# Patient Record
Sex: FEMALE | Race: WHITE
[De-identification: names, ages, dates, MRNs, and addresses within clinical notes are randomized per-mention and may not be internally consistent; named-entity substitution may affect disease eponyms.]

---

## 2017-07-03 ENCOUNTER — HOSPITAL ENCOUNTER (OUTPATIENT)
Dept: HOSPITAL 83 - LAB | Age: 82
Discharge: HOME | End: 2017-07-03
Attending: INTERNAL MEDICINE
Payer: MEDICARE

## 2017-07-03 DIAGNOSIS — I10: Primary | ICD-10-CM

## 2017-07-03 DIAGNOSIS — E78.00: ICD-10-CM

## 2017-07-03 LAB
ALBUMIN SERPL-MCNC: 3.1 GM/DL (ref 3.1–4.5)
ALP SERPL-CCNC: 78 U/L (ref 45–117)
ALT SERPL W P-5'-P-CCNC: 14 U/L (ref 12–78)
AST SERPL-CCNC: 16 IU/L (ref 3–35)
BASOPHILS # BLD AUTO: 0 10*3/UL (ref 0–0.1)
BASOPHILS NFR BLD AUTO: 0.5 % (ref 0–1)
BUN SERPL-MCNC: 17 MG/DL (ref 7–24)
CHLORIDE SERPL-SCNC: 111 MMOL/L (ref 98–107)
CHOLEST SERPL-MCNC: 153 MG/DL (ref ?–200)
CO2 SERPL-SCNC: 26 MMOL/L (ref 21–32)
EOSINOPHIL # BLD AUTO: 0.2 10*3/UL (ref 0–0.4)
EOSINOPHIL # BLD AUTO: 3.1 % (ref 1–4)
ERYTHROCYTE [DISTWIDTH] IN BLOOD BY AUTOMATED COUNT: 15.3 % (ref 0–14.5)
GLUCOSE SERPL-MCNC: 96 MG/DL (ref 65–99)
HCT VFR BLD AUTO: 37.3 % (ref 37–47)
HDLC SERPL-MCNC: 39 MG/DL (ref 40–60)
HGB BLD-MCNC: 11.7 G/DL (ref 12–16)
IG #: 0.1 10*3/UL (ref 0–0.1)
LDLC SERPL DIRECT ASSAY-MCNC: 92 MG/DL (ref 9–159)
LYMPHOCYTES # BLD AUTO: 1.6 10*3/UL (ref 1.3–4.4)
LYMPHOCYTES NFR BLD AUTO: 27.7 % (ref 27–41)
MCH RBC QN AUTO: 28.5 PG (ref 27–31)
MCHC RBC AUTO-ENTMCNC: 31.4 G/DL (ref 33–37)
MCV RBC AUTO: 90.8 FL (ref 81–99)
MONOCYTES # BLD AUTO: 0.4 10*3/UL (ref 0.1–1)
MONOCYTES NFR BLD MANUAL: 7.1 % (ref 3–9)
NEUT #: 3.6 10*3/UL (ref 2.3–7.9)
NEUT %: 60.8 % (ref 47–73)
NRBC BLD QL AUTO: 0 10*3/UL (ref 0–0)
PLATELET # BLD AUTO: 173 10*3/UL (ref 130–400)
PMV BLD AUTO: 10.9 FL (ref 9.6–12.3)
POTASSIUM SERPL-SCNC: 3.8 MMOL/L (ref 3.5–5.1)
PROT SERPL-MCNC: 7 GM/DL (ref 6.4–8.2)
RBC # BLD AUTO: 4.11 10*6/UL (ref 4.1–5.1)
SODIUM SERPL-SCNC: 146 MMOL/L (ref 136–145)
TRIGL SERPL-MCNC: 109 MG/DL (ref ?–150)
TSH SERPL DL<=0.005 MIU/L-ACNC: 1.39 UIU/ML (ref 0.36–4.75)
VLDLC SERPL CALC-MCNC: 22 MG/DL (ref 6–40)
WBC NRBC COR # BLD AUTO: 5.9 10*3/UL (ref 4.8–10.8)

## 2017-11-15 ENCOUNTER — HOSPITAL ENCOUNTER (INPATIENT)
Dept: HOSPITAL 83 - ED | Age: 82
LOS: 3 days | Discharge: OTHER INSTITUTION | DRG: 552 | End: 2017-11-18
Attending: INTERNAL MEDICINE | Admitting: INTERNAL MEDICINE
Payer: MEDICARE

## 2017-11-15 VITALS — BODY MASS INDEX: 24.52 KG/M2 | WEIGHT: 133.25 LBS | HEIGHT: 61.97 IN

## 2017-11-15 VITALS — DIASTOLIC BLOOD PRESSURE: 44 MMHG | SYSTOLIC BLOOD PRESSURE: 158 MMHG

## 2017-11-15 VITALS — DIASTOLIC BLOOD PRESSURE: 57 MMHG

## 2017-11-15 VITALS — DIASTOLIC BLOOD PRESSURE: 59 MMHG
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
## 2017-11-15 DIAGNOSIS — Z98.41: ICD-10-CM

## 2017-11-15 DIAGNOSIS — E87.8: ICD-10-CM

## 2017-11-15 DIAGNOSIS — Z82.49: ICD-10-CM

## 2017-11-15 DIAGNOSIS — S32.302A: ICD-10-CM
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
## 2017-11-15 DIAGNOSIS — I48.91: ICD-10-CM

## 2017-11-15 DIAGNOSIS — Z84.89: ICD-10-CM

## 2017-11-15 DIAGNOSIS — H40.9: ICD-10-CM

## 2017-11-15 DIAGNOSIS — I10: ICD-10-CM
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
## 2017-11-15 DIAGNOSIS — N39.0: ICD-10-CM

## 2017-11-15 DIAGNOSIS — Z86.73: ICD-10-CM

## 2017-11-15 DIAGNOSIS — Z60.2: ICD-10-CM

## 2017-11-15 DIAGNOSIS — D64.9: ICD-10-CM
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
## 2017-11-15 DIAGNOSIS — S32.502A: ICD-10-CM

## 2017-11-15 DIAGNOSIS — R73.9: ICD-10-CM

## 2017-11-15 DIAGNOSIS — S32.10XA: Primary | ICD-10-CM

## 2017-11-15 DIAGNOSIS — Z79.899: ICD-10-CM

## 2017-11-15 DIAGNOSIS — Z88.2: ICD-10-CM

## 2017-11-15 DIAGNOSIS — E78.5: ICD-10-CM

## 2017-11-15 LAB
APPEARANCE UR: (no result)
APTT PPP: 25.6 SECONDS (ref 20.8–31.5)
BACTERIA #/AREA URNS HPF: (no result) /[HPF]
BASOPHILS # BLD AUTO: 0 10*3/UL (ref 0–0.1)
BASOPHILS NFR BLD AUTO: 0.3 % (ref 0–1)
BILIRUB UR QL STRIP: NEGATIVE
BUN SERPL-MCNC: 21 MG/DL (ref 7–24)
CHLORIDE SERPL-SCNC: 108 MMOL/L (ref 98–107)
COLOR UR: YELLOW
CREAT SERPL-MCNC: 0.87 MG/DL (ref 0.55–1.02)
EOSINOPHIL # BLD AUTO: 0.1 10*3/UL (ref 0–0.4)
EOSINOPHIL # BLD AUTO: 0.7 % (ref 1–4)
EPI CELLS #/AREA URNS HPF: (no result) /[HPF]
ERYTHROCYTE [DISTWIDTH] IN BLOOD BY AUTOMATED COUNT: 14.9 % (ref 0–14.5)
GLUCOSE UR QL: NEGATIVE
HCT VFR BLD AUTO: 36.7 % (ref 37–47)
HGB BLD-MCNC: 11.8 G/DL (ref 12–16)
HGB UR QL STRIP: (no result)
INR BLD: 1.1 (ref 2–3.5)
KETONES UR QL STRIP: (no result)
LEUKOCYTE ESTERASE UR QL STRIP: (no result)
LYMPHOCYTES # BLD AUTO: 1.4 10*3/UL (ref 1.3–4.4)
LYMPHOCYTES NFR BLD AUTO: 10.3 % (ref 27–41)
MCH RBC QN AUTO: 28.3 PG (ref 27–31)
MCHC RBC AUTO-ENTMCNC: 32.2 G/DL (ref 33–37)
MCV RBC AUTO: 88 FL (ref 81–99)
MONOCYTES # BLD AUTO: 0.5 10*3/UL (ref 0.1–1)
MONOCYTES NFR BLD MANUAL: 3.9 % (ref 3–9)
NEUT #: 11.4 10*3/UL (ref 2.3–7.9)
NEUT %: 83.2 % (ref 47–73)
NITRITE UR QL STRIP: POSITIVE
NRBC BLD QL AUTO: 0 % (ref 0–0)
PH UR STRIP: 6.5 [PH] (ref 5–9)
PLATELET # BLD AUTO: 146 10*3/UL (ref 130–400)
PMV BLD AUTO: 11.4 FL (ref 9.6–12.3)
POTASSIUM SERPL-SCNC: 4.3 MMOL/L (ref 3.5–5.1)
RBC # BLD AUTO: 4.17 10*6/UL (ref 4.1–5.1)
RBC #/AREA URNS HPF: (no result) RBC/HPF (ref 0–2)
SODIUM SERPL-SCNC: 139 MMOL/L (ref 136–145)
SP GR UR: 1.01 (ref 1–1.03)
UROBILINOGEN UR STRIP-MCNC: 0.2 E.U./DL (ref 0.2–1)
WBC #/AREA URNS HPF: (no result) WBC/HPF (ref 0–5)
WBC NRBC COR # BLD AUTO: 13.7 10*3/UL (ref 4.8–10.8)

## 2017-11-15 SDOH — SOCIAL STABILITY - SOCIAL INSECURITY: PROBLEMS RELATED TO LIVING ALONE: Z60.2

## 2017-11-16 VITALS — DIASTOLIC BLOOD PRESSURE: 50 MMHG | SYSTOLIC BLOOD PRESSURE: 138 MMHG

## 2017-11-16 VITALS — DIASTOLIC BLOOD PRESSURE: 51 MMHG

## 2017-11-16 VITALS — SYSTOLIC BLOOD PRESSURE: 160 MMHG | DIASTOLIC BLOOD PRESSURE: 78 MMHG

## 2017-11-16 VITALS — DIASTOLIC BLOOD PRESSURE: 61 MMHG | SYSTOLIC BLOOD PRESSURE: 158 MMHG

## 2017-11-16 VITALS — DIASTOLIC BLOOD PRESSURE: 53 MMHG

## 2017-11-16 LAB
25(OH)D3 SERPL-MCNC: 36.6 NG/ML (ref 30–100)
BASOPHILS # BLD AUTO: 0 10*3/UL (ref 0–0.1)
BASOPHILS NFR BLD AUTO: 0.5 % (ref 0–1)
BUN SERPL-MCNC: 23 MG/DL (ref 7–24)
CHLORIDE SERPL-SCNC: 108 MMOL/L (ref 98–107)
CREAT SERPL-MCNC: 0.81 MG/DL (ref 0.55–1.02)
EOSINOPHIL # BLD AUTO: 0 10*3/UL (ref 0–0.4)
EOSINOPHIL # BLD AUTO: 0.5 % (ref 1–4)
ERYTHROCYTE [DISTWIDTH] IN BLOOD BY AUTOMATED COUNT: 15 % (ref 0–14.5)
HCT VFR BLD AUTO: 31.3 % (ref 37–47)
HGB BLD-MCNC: 10.2 G/DL (ref 12–16)
LYMPHOCYTES # BLD AUTO: 1.2 10*3/UL (ref 1.3–4.4)
LYMPHOCYTES NFR BLD AUTO: 18.1 % (ref 27–41)
MCH RBC QN AUTO: 28.8 PG (ref 27–31)
MCHC RBC AUTO-ENTMCNC: 32.6 G/DL (ref 33–37)
MCV RBC AUTO: 88.4 FL (ref 81–99)
MONOCYTES # BLD AUTO: 0.4 10*3/UL (ref 0.1–1)
MONOCYTES NFR BLD MANUAL: 6.6 % (ref 3–9)
NEUT #: 4.7 10*3/UL (ref 2.3–7.9)
NEUT %: 73.5 % (ref 47–73)
NRBC BLD QL AUTO: 0 % (ref 0–0)
PLATELET # BLD AUTO: 131 10*3/UL (ref 130–400)
PMV BLD AUTO: 12.5 FL (ref 9.6–12.3)
POTASSIUM SERPL-SCNC: 4 MMOL/L (ref 3.5–5.1)
RBC # BLD AUTO: 3.54 10*6/UL (ref 4.1–5.1)
SODIUM SERPL-SCNC: 140 MMOL/L (ref 136–145)
TSH SERPL DL<=0.005 MIU/L-ACNC: 0.91 UIU/ML (ref 0.36–4.75)
VITAMIN B12: 308 PG/ML (ref 247–911)
WBC NRBC COR # BLD AUTO: 6.4 10*3/UL (ref 4.8–10.8)

## 2017-11-17 VITALS — DIASTOLIC BLOOD PRESSURE: 78 MMHG | SYSTOLIC BLOOD PRESSURE: 120 MMHG

## 2017-11-17 VITALS — DIASTOLIC BLOOD PRESSURE: 54 MMHG | SYSTOLIC BLOOD PRESSURE: 140 MMHG

## 2017-11-17 VITALS — DIASTOLIC BLOOD PRESSURE: 60 MMHG

## 2017-11-17 VITALS — DIASTOLIC BLOOD PRESSURE: 68 MMHG

## 2017-11-17 VITALS — SYSTOLIC BLOOD PRESSURE: 137 MMHG | DIASTOLIC BLOOD PRESSURE: 50 MMHG

## 2017-11-18 VITALS — SYSTOLIC BLOOD PRESSURE: 134 MMHG | DIASTOLIC BLOOD PRESSURE: 48 MMHG

## 2017-11-18 VITALS — DIASTOLIC BLOOD PRESSURE: 58 MMHG

## 2017-12-11 ENCOUNTER — HOSPITAL ENCOUNTER (OUTPATIENT)
Dept: HOSPITAL 83 - ORTHO | Age: 82
Discharge: HOME | End: 2017-12-11
Attending: ORTHOPAEDIC SURGERY
Payer: MEDICARE
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
## 2017-12-11 DIAGNOSIS — S32.502D: Primary | ICD-10-CM

## 2018-04-06 ENCOUNTER — HOSPITAL ENCOUNTER (OUTPATIENT)
Dept: HOSPITAL 83 - RAD | Age: 83
Discharge: HOME | End: 2018-04-06
Attending: INTERNAL MEDICINE
Payer: MEDICARE

## 2018-04-06 DIAGNOSIS — M40.294: ICD-10-CM

## 2018-04-06 DIAGNOSIS — I51.7: ICD-10-CM

## 2018-04-06 DIAGNOSIS — I10: ICD-10-CM

## 2018-04-06 DIAGNOSIS — J90: Primary | ICD-10-CM

## 2018-04-10 ENCOUNTER — HOSPITAL ENCOUNTER (OUTPATIENT)
Dept: HOSPITAL 83 - CARD | Age: 83
Discharge: HOME | End: 2018-04-10
Attending: INTERNAL MEDICINE
Payer: MEDICARE

## 2018-04-10 DIAGNOSIS — I35.0: Primary | ICD-10-CM

## 2018-04-18 ENCOUNTER — HOSPITAL ENCOUNTER (OUTPATIENT)
Dept: HOSPITAL 83 - RAD | Age: 83
Discharge: HOME | End: 2018-04-18
Attending: INTERNAL MEDICINE
Payer: MEDICARE

## 2018-04-18 DIAGNOSIS — R06.09: Primary | ICD-10-CM

## 2019-09-25 ENCOUNTER — HOSPITAL ENCOUNTER (INPATIENT)
Dept: HOSPITAL 83 - ED | Age: 84
LOS: 6 days | Discharge: OTHER INSTITUTION | DRG: 871 | End: 2019-10-01
Attending: INTERNAL MEDICINE | Admitting: INTERNAL MEDICINE
Payer: MEDICARE

## 2019-09-25 VITALS — SYSTOLIC BLOOD PRESSURE: 130 MMHG | DIASTOLIC BLOOD PRESSURE: 68 MMHG

## 2019-09-25 VITALS — DIASTOLIC BLOOD PRESSURE: 78 MMHG

## 2019-09-25 VITALS — HEIGHT: 62 IN | BODY MASS INDEX: 24.91 KG/M2 | WEIGHT: 135.37 LBS

## 2019-09-25 VITALS — DIASTOLIC BLOOD PRESSURE: 94 MMHG | SYSTOLIC BLOOD PRESSURE: 141 MMHG

## 2019-09-25 VITALS — SYSTOLIC BLOOD PRESSURE: 129 MMHG | DIASTOLIC BLOOD PRESSURE: 72 MMHG

## 2019-09-25 VITALS — DIASTOLIC BLOOD PRESSURE: 72 MMHG

## 2019-09-25 VITALS — DIASTOLIC BLOOD PRESSURE: 97 MMHG

## 2019-09-25 VITALS — DIASTOLIC BLOOD PRESSURE: 99 MMHG | SYSTOLIC BLOOD PRESSURE: 145 MMHG

## 2019-09-25 VITALS — DIASTOLIC BLOOD PRESSURE: 104 MMHG

## 2019-09-25 VITALS — DIASTOLIC BLOOD PRESSURE: 92 MMHG | SYSTOLIC BLOOD PRESSURE: 182 MMHG

## 2019-09-25 VITALS — DIASTOLIC BLOOD PRESSURE: 77 MMHG | SYSTOLIC BLOOD PRESSURE: 148 MMHG

## 2019-09-25 VITALS — DIASTOLIC BLOOD PRESSURE: 97 MMHG | SYSTOLIC BLOOD PRESSURE: 138 MMHG

## 2019-09-25 VITALS — DIASTOLIC BLOOD PRESSURE: 59 MMHG | SYSTOLIC BLOOD PRESSURE: 142 MMHG

## 2019-09-25 VITALS — DIASTOLIC BLOOD PRESSURE: 84 MMHG

## 2019-09-25 VITALS — DIASTOLIC BLOOD PRESSURE: 81 MMHG

## 2019-09-25 VITALS — DIASTOLIC BLOOD PRESSURE: 108 MMHG

## 2019-09-25 VITALS — DIASTOLIC BLOOD PRESSURE: 74 MMHG

## 2019-09-25 VITALS — DIASTOLIC BLOOD PRESSURE: 103 MMHG

## 2019-09-25 VITALS — DIASTOLIC BLOOD PRESSURE: 85 MMHG

## 2019-09-25 VITALS — DIASTOLIC BLOOD PRESSURE: 90 MMHG

## 2019-09-25 DIAGNOSIS — S32.020A: ICD-10-CM

## 2019-09-25 DIAGNOSIS — I50.22: ICD-10-CM

## 2019-09-25 DIAGNOSIS — A41.9: Primary | ICD-10-CM

## 2019-09-25 DIAGNOSIS — E78.5: ICD-10-CM
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
## 2019-09-25 DIAGNOSIS — Z60.2: ICD-10-CM

## 2019-09-25 DIAGNOSIS — Z90.89: ICD-10-CM

## 2019-09-25 DIAGNOSIS — N39.0: ICD-10-CM

## 2019-09-25 DIAGNOSIS — Z87.81: ICD-10-CM

## 2019-09-25 DIAGNOSIS — F32.9: ICD-10-CM

## 2019-09-25 DIAGNOSIS — I24.8: ICD-10-CM

## 2019-09-25 DIAGNOSIS — Z88.2: ICD-10-CM

## 2019-09-25 DIAGNOSIS — S32.010A: ICD-10-CM

## 2019-09-25 DIAGNOSIS — Z86.73: ICD-10-CM
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
## 2019-09-25 DIAGNOSIS — I11.0: ICD-10-CM

## 2019-09-25 DIAGNOSIS — S22.31XA: ICD-10-CM
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
## 2019-09-25 DIAGNOSIS — Z84.89: ICD-10-CM

## 2019-09-25 DIAGNOSIS — Z82.49: ICD-10-CM

## 2019-09-25 DIAGNOSIS — E87.8: ICD-10-CM

## 2019-09-25 DIAGNOSIS — E87.2: ICD-10-CM

## 2019-09-25 DIAGNOSIS — E83.42: ICD-10-CM

## 2019-09-25 DIAGNOSIS — R65.20: ICD-10-CM

## 2019-09-25 DIAGNOSIS — I48.91: ICD-10-CM

## 2019-09-25 DIAGNOSIS — E87.6: ICD-10-CM
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
## 2019-09-25 DIAGNOSIS — R74.0: ICD-10-CM

## 2019-09-25 DIAGNOSIS — E44.0: ICD-10-CM

## 2019-09-25 DIAGNOSIS — Z87.440: ICD-10-CM

## 2019-09-25 DIAGNOSIS — I73.9: ICD-10-CM

## 2019-09-25 DIAGNOSIS — Z79.899: ICD-10-CM

## 2019-09-25 DIAGNOSIS — Z98.41: ICD-10-CM

## 2019-09-25 DIAGNOSIS — J15.6: ICD-10-CM

## 2019-09-25 DIAGNOSIS — I08.0: ICD-10-CM

## 2019-09-25 DIAGNOSIS — R41.9: ICD-10-CM

## 2019-09-25 DIAGNOSIS — D64.9: ICD-10-CM

## 2019-09-25 LAB
ALBUMIN SERPL-MCNC: 3.5 GM/DL (ref 3.1–4.5)
ALP SERPL-CCNC: 75 U/L (ref 45–117)
ALT SERPL W P-5'-P-CCNC: 26 U/L (ref 12–78)
APPEARANCE UR: (no result)
AST SERPL-CCNC: 42 IU/L (ref 3–35)
BACTERIA #/AREA URNS HPF: (no result) /[HPF]
BASOPHILS # BLD AUTO: 0 10*3/UL (ref 0–0.1)
BASOPHILS NFR BLD AUTO: 0.5 % (ref 0–1)
BILIRUB UR QL STRIP: (no result)
BUN SERPL-MCNC: 20 MG/DL (ref 7–24)
CHLORIDE SERPL-SCNC: 108 MMOL/L (ref 98–107)
COLOR UR: YELLOW
CREAT SERPL-MCNC: 0.85 MG/DL (ref 0.55–1.02)
CREAT SERPL-MCNC: 0.93 MG/DL (ref 0.55–1.02)
EOSINOPHIL # BLD AUTO: 0 % (ref 1–4)
EOSINOPHIL # BLD AUTO: 0 10*3/UL (ref 0–0.4)
EPI CELLS #/AREA URNS HPF: (no result) /[HPF]
ERYTHROCYTE [DISTWIDTH] IN BLOOD BY AUTOMATED COUNT: 17.7 % (ref 0–14.5)
GLUCOSE UR QL: NEGATIVE
HCT VFR BLD AUTO: 35.9 % (ref 37–47)
HGB BLD-MCNC: 10.7 G/DL (ref 12–16)
HGB UR QL STRIP: (no result)
KETONES UR QL STRIP: (no result)
LEUKOCYTE ESTERASE UR QL STRIP: NEGATIVE
LYMPHOCYTES # BLD AUTO: 0.8 10*3/UL (ref 1.3–4.4)
LYMPHOCYTES NFR BLD AUTO: 12.6 % (ref 27–41)
MCH RBC QN AUTO: 27.4 PG (ref 27–31)
MCHC RBC AUTO-ENTMCNC: 29.8 G/DL (ref 33–37)
MCV RBC AUTO: 91.8 FL (ref 81–99)
MONOCYTES # BLD AUTO: 0.4 10*3/UL (ref 0.1–1)
MONOCYTES NFR BLD MANUAL: 6.4 % (ref 3–9)
MYOGLOBIN SERPL-MCNC: 263 NG/ML (ref 13–71)
NEUT #: 4.8 10*3/UL (ref 2.3–7.9)
NEUT %: 79.3 % (ref 47–73)
NITRITE UR QL STRIP: NEGATIVE
NRBC BLD QL AUTO: 0 % (ref 0–0)
PH UR STRIP: 6.5 [PH] (ref 5–9)
PLATELET # BLD AUTO: 170 10*3/UL (ref 130–400)
PMV BLD AUTO: 12.1 FL (ref 9.6–12.3)
POTASSIUM SERPL-SCNC: 3.4 MMOL/L (ref 3.5–5.1)
PROT SERPL-MCNC: 7.8 GM/DL (ref 6.4–8.2)
RBC # BLD AUTO: 3.91 10*6/UL (ref 4.1–5.1)
RBC #/AREA URNS HPF: (no result) RBC/HPF (ref 0–2)
SODIUM SERPL-SCNC: 138 MMOL/L (ref 136–145)
SP GR UR: 1.02 (ref 1–1.03)
UROBILINOGEN UR STRIP-MCNC: 1 E.U./DL (ref 0.2–1)
WBC #/AREA URNS HPF: (no result) WBC/HPF (ref 0–5)
WBC NRBC COR # BLD AUTO: 6.1 10*3/UL (ref 4.8–10.8)

## 2019-09-25 SDOH — SOCIAL STABILITY - SOCIAL INSECURITY: PROBLEMS RELATED TO LIVING ALONE: Z60.2

## 2019-09-25 NOTE — NUR
PT TAKEN OFF BEDPAN, SHEETS AND NEW GOWN PLACED ON PATIENT. URINE SPECIMAN
SENT. PT VOICES NO COMPLAINTS.

## 2019-09-25 NOTE — NUR
A 89, admitted to 4E, under the
services of CECILIA Guevara DO with a diagnosis of AFIB RVR,PNA,SEPSIS.
Chief complaint is AFIB RVR..
Patient arrived via bed from ER.
Monitor applied. Initial assessment completed.
Vital signs taken and recorded.
CECILIA GUEVARA DO notified of admission to the unit.
Orders received.
See assessment for past medical history, medications
and allergies.
Patient oriented to unit.
Clothing/patient valuable form completed.
 
MICHAEL HAWKINS

## 2019-09-26 VITALS — DIASTOLIC BLOOD PRESSURE: 61 MMHG | SYSTOLIC BLOOD PRESSURE: 136 MMHG

## 2019-09-26 VITALS — DIASTOLIC BLOOD PRESSURE: 55 MMHG

## 2019-09-26 VITALS — SYSTOLIC BLOOD PRESSURE: 128 MMHG | DIASTOLIC BLOOD PRESSURE: 86 MMHG

## 2019-09-26 VITALS — DIASTOLIC BLOOD PRESSURE: 67 MMHG | SYSTOLIC BLOOD PRESSURE: 148 MMHG

## 2019-09-26 VITALS — DIASTOLIC BLOOD PRESSURE: 72 MMHG | SYSTOLIC BLOOD PRESSURE: 128 MMHG

## 2019-09-26 VITALS — DIASTOLIC BLOOD PRESSURE: 68 MMHG

## 2019-09-26 VITALS — DIASTOLIC BLOOD PRESSURE: 70 MMHG | SYSTOLIC BLOOD PRESSURE: 132 MMHG

## 2019-09-26 VITALS — DIASTOLIC BLOOD PRESSURE: 64 MMHG

## 2019-09-26 VITALS — SYSTOLIC BLOOD PRESSURE: 131 MMHG | DIASTOLIC BLOOD PRESSURE: 80 MMHG

## 2019-09-26 LAB
ALBUMIN SERPL-MCNC: 2.9 GM/DL (ref 3.1–4.5)
ALP SERPL-CCNC: 60 U/L (ref 45–117)
ALT SERPL W P-5'-P-CCNC: 25 U/L (ref 12–78)
AST SERPL-CCNC: 38 IU/L (ref 3–35)
BASOPHILS # BLD AUTO: 0 10*3/UL (ref 0–0.1)
BASOPHILS NFR BLD AUTO: 0.5 % (ref 0–1)
BUN SERPL-MCNC: 26 MG/DL (ref 7–24)
CHLORIDE SERPL-SCNC: 112 MMOL/L (ref 98–107)
CREAT SERPL-MCNC: 0.92 MG/DL (ref 0.55–1.02)
EOSINOPHIL # BLD AUTO: 0 10*3/UL (ref 0–0.4)
EOSINOPHIL # BLD AUTO: 0.2 % (ref 1–4)
ERYTHROCYTE [DISTWIDTH] IN BLOOD BY AUTOMATED COUNT: 17.7 % (ref 0–14.5)
HCT VFR BLD AUTO: 30.7 % (ref 37–47)
HGB BLD-MCNC: 9.2 G/DL (ref 12–16)
INR BLD: 1.3 (ref 2–3.5)
LYMPHOCYTES # BLD AUTO: 0.9 10*3/UL (ref 1.3–4.4)
LYMPHOCYTES NFR BLD AUTO: 14.5 % (ref 27–41)
MCH RBC QN AUTO: 27.1 PG (ref 27–31)
MCHC RBC AUTO-ENTMCNC: 30 G/DL (ref 33–37)
MCV RBC AUTO: 90.6 FL (ref 81–99)
MONOCYTES # BLD AUTO: 0.4 10*3/UL (ref 0.1–1)
MONOCYTES NFR BLD MANUAL: 6.9 % (ref 3–9)
NEUT #: 4.5 10*3/UL (ref 2.3–7.9)
NEUT %: 76.7 % (ref 47–73)
NRBC BLD QL AUTO: 0 10*3/UL (ref 0–0)
PHOSPHATE SERPL-MCNC: 2.7 MG/DL (ref 2.5–4.9)
PLATELET # BLD AUTO: 159 10*3/UL (ref 130–400)
PMV BLD AUTO: 11.8 FL (ref 9.6–12.3)
POTASSIUM SERPL-SCNC: 3.4 MMOL/L (ref 3.5–5.1)
PROT SERPL-MCNC: 6.5 GM/DL (ref 6.4–8.2)
RBC # BLD AUTO: 3.39 10*6/UL (ref 4.1–5.1)
SODIUM SERPL-SCNC: 141 MMOL/L (ref 136–145)
WBC NRBC COR # BLD AUTO: 5.9 10*3/UL (ref 4.8–10.8)

## 2019-09-26 NOTE — NUR
SPOKE WITH  REGARDING HR DIPPING INTO THE 40'S BUT STAYING IN THE 50'S
CURRENTLY. PT ASYMPTOMATIC. SCHEDULED IV DIGOXIN TO BE HELD PER . WILL
CONTINUE TO MONITOR. BP STABLE.

## 2019-09-26 NOTE — NUR
"Requested Prescriptions   Pending Prescriptions Disp Refills     chlorthalidone (HYGROTON) 25 MG tablet [Pharmacy Med Name: CHLORTHALIDONE 25MG TABLETS]  Last Written Prescription Date:  12/1/17  Last Fill Quantity: 90,  # refills: 1   Last office visit: 3/2/2018 with prescribing provider:  3/2/2018     Future Office Visit:     90 tablet 0     Sig: TAKE 1 TABLET(25 MG) BY MOUTH DAILY    Diuretics (Including Combos) Protocol Passed    5/31/2018 11:01 AM       Passed - Blood pressure under 140/90 in past 12 months    BP Readings from Last 3 Encounters:   03/02/18 (P) 132/68   07/21/17 138/76   06/23/17 128/70                Passed - Recent (12 mo) or future (30 days) visit within the authorizing provider's specialty    Patient had office visit in the last 12 months or has a visit in the next 30 days with authorizing provider or within the authorizing provider's specialty.  See \"Patient Info\" tab in inbasket, or \"Choose Columns\" in Meds & Orders section of the refill encounter.           Passed - Patient is age 18 or older       Passed - No active pregancy on record       Passed - Normal serum creatinine on file in past 12 months    Recent Labs   Lab Test  05/22/18   0759   CR  0.81             Passed - Normal serum potassium on file in past 12 months    Recent Labs   Lab Test  05/22/18   0759   POTASSIUM  3.7                   Passed - Normal serum sodium on file in past 12 months    Recent Labs   Lab Test  05/22/18   0759   NA  134             Passed - No positive pregnancy test in past 12 months          " PHYSICAL THERAPY
 
Physical therapy evaluation completed. Full details and evaluation to follow.
Low complexity skilled PT evaluation performed (48480).  PT will work on
strength, transfers, gait, bed mobility, and safety per POC. Recommend SNF
upon discharge. Thank you, Anny Martinez,SPT Mendy Sanders,PT,DPT

## 2019-09-26 NOTE — NUR
PT ASLEEP AT THIS TIME. CARDIZEM DRIP RUNNING INTO LEFT HAND IV WITHOUT
DIFFICULTY. NO CONCERNS NOTED. RESPIRATIONS EASY. CALL LIGHT IN REACH.

## 2019-09-26 NOTE — NUR
NOTIFIED REGARDING HR DIPPING INTO THE 50'S. PATIENT STILL IN A-FIB.
PT ASYMPTOMATIC. BP STABLE. WILL CONTINUE TO MONITOR HR. CALL LIGHT WITHIN
REACH.

## 2019-09-26 NOTE — NUR
PT MEDICATED WITH PO NORCO PER PRN ORDER FOR C/O BACK PAIN. UNABLE TO RATE
PAIN ON A 1/10 SCALE. BILATERAL HEEL PROTECTORS APPLIED. FAMILY MEMBER AT
BEDSIDE. WILL MONITOR EFFECTIVENESS.

## 2019-09-26 NOTE — NUR
PATIENT IS RESTING IN BED, NO DISTRESS NOTED, RESP ARE ERND ON ROOM AIR.
PATIENT STATES NO COMPLAINTS. CALL LIGHT WITHIN REACH

## 2019-09-26 NOTE — NUR
SPEECH PATHOLOGY
Nursing screen completed. There are no reports of acute communication or
swallowing problems. Speech services do not appear warranted however this
dept. will be available should future needs arise.
 
IVY CARTWRIGHT MSCCC-SLP

## 2019-09-26 NOTE — NUR
DR. GOINS NOTIFIED OF HR IN 90'S 'S. INRSTRUCTED TO LEAVE CARDIZEM DRIP
AT 5ML/HR AND WAIT AN HOUR AN ASSESS EFFECTIVENESS OF PO LOPRESSOR AND
CARDIZEM. WILL CALL BACK WITH RESULTS IN ONE HOUR.

## 2019-09-26 NOTE — NUR
PHYSICAL THERAPY
 
Physical therapy referral received and skilled PT evaluation completed
9/26/19.  Thank you, Anny Martinez, SPT Mendy Sanders,PT,DPT

## 2019-09-26 NOTE — NUR
Reina was evaluated by occupational therapy this date.  She has a history of
recent frequent falls and inability to care for herself at home.  Patient
requries increased assist with ADL's and functional transfers secondary to
decreased balance, decreased endurance, and decreased strength.  Continued
occuaptional therapy is recommended in a SNF when patient is discharged.
Farzaneh Jiménez

## 2019-09-27 VITALS — DIASTOLIC BLOOD PRESSURE: 63 MMHG | SYSTOLIC BLOOD PRESSURE: 171 MMHG

## 2019-09-27 VITALS — DIASTOLIC BLOOD PRESSURE: 62 MMHG | SYSTOLIC BLOOD PRESSURE: 157 MMHG

## 2019-09-27 VITALS — DIASTOLIC BLOOD PRESSURE: 77 MMHG

## 2019-09-27 VITALS — DIASTOLIC BLOOD PRESSURE: 61 MMHG

## 2019-09-27 NOTE — NUR
SPOKE WITH PATIENT AND FAMILY MEMBER AT BEDSIDE ABOUT PLAN OF CARE. PATIENT
INFORMED THAT  ARE CONSULTED FOR SNF PLACEMENT. PATIENT VOICES
CONCERNS AND STATES SHE DOES NOT WANT TO GO TO A NURSING HOME. EDUCATION
PROVIDED ABOUT PHYSICAL THERAPY AND SNF.

## 2019-09-27 NOTE — NUR
OT NOTE
Pt was seen this P.M. 1:1 for 15 minute OT session. Upon arrival pt was supine
in bed. Pt identified by name and  and had complaints of "mild low back
pain." Pt transferred supine to sit EOB with Cosme for assist with UB. Sit to
stand completed from bed level with Cosme and use of w/w for UE support.
Functional mobility was completed into the bathroom with CGA and use of w/w
with occasional verbal prompts for correcting walker safety. Pt transferred
on/off standard commode with Cosme due to low surface. Clothing management
completed with Cosme and toilet hygiene completed with SBA while seated.
Functional mobility completed back to the EOB where she transferred sit to
supine with Cosme. There she was left with call light in hand, tray table in
place, and bed alarm activated for safety. Continue with rec D/C plan to SNF.
 
MAGALIE Cao/JUDSON

## 2019-09-27 NOTE — NUR
case management visits with patient, she was sitting in bed eating breakfast,
she was alert and oriented this am, discussed with her a discharge plan
including a short term skilled nursing for rehab prior to returning home, she
declined this, stated she would be returning home, also discussed with her VNA
and she was receptive to this, given choice of companies she chose [facility], will
send an order to [facility] for when patient is medically stable for discharge, case
management will follow

## 2019-09-27 NOTE — NUR
NORCO GIVEN FOR C/O LOWER BACK PAIN RATED 7/10. CALL LIGHT IN REACH. TOLERATED
ROUTINE MEDS WELL WHOLE IN APPLESAUCE.

## 2019-09-27 NOTE — NUR
PHYSICAL THERAPY
 
Patient seen this pm 1;1 for therapy visit and was resting supine in bed upon
therapist arrival. Patient identified by name /  and was very pleasant this
afternoon. Patient transfers supine to sit EOB with MIN A x 1, taking a minute
or so to collect herself. Patient performed sit to stand transfer, MIN A and
ambulated with use of wh walker, CGA/MIN, demonstrating slow layton,
decreased stride and increased fatigue to bathroom, 20'x 2. Patient needed v/c
to improve walker safety / navigation and returned to supine in bed with call
light, tray table, telephone and bed alarm. Will continue per POC as
tolerated, total treatment time 17 minutes. Richard Knox, PTA

## 2019-09-27 NOTE — NUR
SPOKE WITH PATIENT'S NEPHEW ON THE PHONE. PERMISSION RECIEVED FROM PATIENT TO
RELEASE INFORMATION TO NEPHEW.

## 2019-09-27 NOTE — NUR
Spoke with patients nephew Kaushal Diya. He states patient lives alone and
does fairly well. She has a walker, has never used oxygen, and she makes her
own decisions. He stated if patient "agrees" to go to snf for rehab they would
prefer [facility]. [facility]. [facility].

## 2019-09-28 VITALS — DIASTOLIC BLOOD PRESSURE: 68 MMHG | SYSTOLIC BLOOD PRESSURE: 163 MMHG

## 2019-09-28 VITALS — DIASTOLIC BLOOD PRESSURE: 68 MMHG

## 2019-09-28 VITALS — SYSTOLIC BLOOD PRESSURE: 150 MMHG | DIASTOLIC BLOOD PRESSURE: 57 MMHG

## 2019-09-28 VITALS — DIASTOLIC BLOOD PRESSURE: 55 MMHG | SYSTOLIC BLOOD PRESSURE: 151 MMHG

## 2019-09-28 VITALS — DIASTOLIC BLOOD PRESSURE: 73 MMHG

## 2019-09-28 LAB
BASOPHILS # BLD AUTO: 0 10*3/UL (ref 0–0.1)
BASOPHILS NFR BLD AUTO: 0.7 % (ref 0–1)
BUN SERPL-MCNC: 20 MG/DL (ref 7–24)
CHLORIDE SERPL-SCNC: 111 MMOL/L (ref 98–107)
CREAT SERPL-MCNC: 0.83 MG/DL (ref 0.55–1.02)
EOSINOPHIL # BLD AUTO: 0.1 10*3/UL (ref 0–0.4)
EOSINOPHIL # BLD AUTO: 1.1 % (ref 1–4)
ERYTHROCYTE [DISTWIDTH] IN BLOOD BY AUTOMATED COUNT: 18 % (ref 0–14.5)
HCT VFR BLD AUTO: 34.9 % (ref 37–47)
HGB BLD-MCNC: 10.6 G/DL (ref 12–16)
LYMPHOCYTES # BLD AUTO: 0.7 10*3/UL (ref 1.3–4.4)
LYMPHOCYTES NFR BLD AUTO: 15.3 % (ref 27–41)
MCH RBC QN AUTO: 27.3 PG (ref 27–31)
MCHC RBC AUTO-ENTMCNC: 30.4 G/DL (ref 33–37)
MCV RBC AUTO: 89.9 FL (ref 81–99)
MONOCYTES # BLD AUTO: 0.4 10*3/UL (ref 0.1–1)
MONOCYTES NFR BLD MANUAL: 8.1 % (ref 3–9)
NEUT #: 3.3 10*3/UL (ref 2.3–7.9)
NEUT %: 73.7 % (ref 47–73)
NRBC BLD QL AUTO: 0 10*3/UL (ref 0–0)
PLATELET # BLD AUTO: 205 10*3/UL (ref 130–400)
PMV BLD AUTO: 11.8 FL (ref 9.6–12.3)
POTASSIUM SERPL-SCNC: 3.8 MMOL/L (ref 3.5–5.1)
RBC # BLD AUTO: 3.88 10*6/UL (ref 4.1–5.1)
SODIUM SERPL-SCNC: 140 MMOL/L (ref 136–145)
WBC NRBC COR # BLD AUTO: 4.5 10*3/UL (ref 4.8–10.8)

## 2019-09-28 NOTE — NUR
CALLED TO ROOM FRO C/O PAIN TO LOWER BACK. Skanee GIVN FOR BACK PAIN RATED 8/10
ACHING AND DULL. CALL LIGHT IN REACH. BED ALARM ON. WILL MONITOR.

## 2019-09-28 NOTE — NUR
IN TO ROOM. PATIENT AWAKE, ALERT AND ORIENTED. VERY PLEASANT AND COOPERATIVE
WITH ASSESSMENT. NO STATED COMPLAINTS. NO S/S OF DISTRESS. DENIES PAIN.
RESPIRATIONS EASY AND REGULAR.
 
PT SET UP FOR BREAKFAST AND SITUATED IN BED.
 
BED IN LOWEST LOCKED POSITION AND CALL LIGHT WITHIN REACH. WILL COTINUE TO
MONITOR.

## 2019-09-28 NOTE — NUR
TOLERATED ROUTINE MEDS AND PRN NORCO WELL, WHOLE IN APPLESAUCE. C/O BACK PAIN
RATED 9/10. CALL LIGHT IN REACH. WILL CONTINUE TO MONITOR. BED ALARM ON.

## 2019-09-28 NOTE — NUR
BEDSIDE REPORT RECIEVED FROM YULISSA MARCUM. NO S/S OF DISTRESS OR SOB AT THIS TIME.
WILL CONTINUE TO MONITOR.

## 2019-09-29 VITALS — DIASTOLIC BLOOD PRESSURE: 59 MMHG

## 2019-09-29 VITALS — DIASTOLIC BLOOD PRESSURE: 84 MMHG

## 2019-09-29 VITALS — DIASTOLIC BLOOD PRESSURE: 90 MMHG

## 2019-09-29 VITALS — DIASTOLIC BLOOD PRESSURE: 80 MMHG

## 2019-09-29 NOTE — NUR
BEDSIDE REPORT RECIEVED FROM ADAMA MARCUM. PATIENT AWAKE, ALERT AND ORIENTED SITTING
UP IN BED. FAMILY AT BEDSIDE. NO STATED COMPLAINTS. NO S/S OF SOB OR DISTRESS.
BED IN LOWEST LOCKED POSITION AND CALL LIGHT WITHIN REACH. WILL CONTINUE TO
MONITOR.

## 2019-09-29 NOTE — NUR
SLEEPING, CALL LIGHT WITHIN REACH. BED ALARM ON. BED IN LOWEST POSITION WITH
WHEELS LOCKED. NORCO SEEMS TO BE EFFECTIVE FOR EARLIER COMPLAINTS OF BACK
PAIN.

## 2019-09-30 VITALS — DIASTOLIC BLOOD PRESSURE: 69 MMHG

## 2019-09-30 VITALS — DIASTOLIC BLOOD PRESSURE: 84 MMHG

## 2019-09-30 VITALS — DIASTOLIC BLOOD PRESSURE: 70 MMHG | SYSTOLIC BLOOD PRESSURE: 147 MMHG

## 2019-09-30 VITALS — DIASTOLIC BLOOD PRESSURE: 72 MMHG

## 2019-09-30 VITALS — DIASTOLIC BLOOD PRESSURE: 71 MMHG

## 2019-09-30 LAB
BASOPHILS # BLD AUTO: 0 10*3/UL (ref 0–0.1)
BASOPHILS NFR BLD AUTO: 0.5 % (ref 0–1)
BUN SERPL-MCNC: 13 MG/DL (ref 7–24)
CHLORIDE SERPL-SCNC: 112 MMOL/L (ref 98–107)
CREAT SERPL-MCNC: 0.78 MG/DL (ref 0.55–1.02)
EOSINOPHIL # BLD AUTO: 0.1 10*3/UL (ref 0–0.4)
EOSINOPHIL # BLD AUTO: 2 % (ref 1–4)
ERYTHROCYTE [DISTWIDTH] IN BLOOD BY AUTOMATED COUNT: 17.5 % (ref 0–14.5)
HCT VFR BLD AUTO: 35 % (ref 37–47)
HGB BLD-MCNC: 10.8 G/DL (ref 12–16)
LYMPHOCYTES # BLD AUTO: 0.9 10*3/UL (ref 1.3–4.4)
LYMPHOCYTES NFR BLD AUTO: 22 % (ref 27–41)
MCH RBC QN AUTO: 27.1 PG (ref 27–31)
MCHC RBC AUTO-ENTMCNC: 30.9 G/DL (ref 33–37)
MCV RBC AUTO: 87.7 FL (ref 81–99)
MONOCYTES # BLD AUTO: 0.5 10*3/UL (ref 0.1–1)
MONOCYTES NFR BLD MANUAL: 12 % (ref 3–9)
NEUT #: 2.5 10*3/UL (ref 2.3–7.9)
NEUT %: 62.5 % (ref 47–73)
NRBC BLD QL AUTO: 0 % (ref 0–0)
PLATELET # BLD AUTO: 209 10*3/UL (ref 130–400)
PMV BLD AUTO: 11.9 FL (ref 9.6–12.3)
POTASSIUM SERPL-SCNC: 3.5 MMOL/L (ref 3.5–5.1)
RBC # BLD AUTO: 3.99 10*6/UL (ref 4.1–5.1)
SODIUM SERPL-SCNC: 140 MMOL/L (ref 136–145)
WBC NRBC COR # BLD AUTO: 4 10*3/UL (ref 4.8–10.8)

## 2019-09-30 NOTE — NUR
ARRIVED ON SHIFT, INTRODUCED TO PATIENT, BEDSIDE REPORT RECEIVED, WHITE BOARD
UPDATED, NO NEEDS VOICED ST THIS TIME.

## 2019-09-30 NOTE — NUR
PATIENT IS RESTING IN BED WITH EASY AND REGULAR RESPERS ON ROOM AIR.
ASSESSMENT IS COMPLETE WITH NO C/O OR S/S OF DISTRESS NOTED AT THIS TIME. BED
IS LOW, LOCKED, ALARMED, AND CALL LIGHT IS WITHIN REACH. WILL CONTNINUE TO
MONITOR SEE SHIFT ASSESSMENT.

## 2019-09-30 NOTE — NUR
OT NOTE
Pt was seen this A.M. 1:1 for 20 minute OT session. Upon arrival pt was supine
in bed. Pt identified by name and  and had complaints of low back pain
which she did not rate on 0-10 pain scale. Pt transferred supine to sit EOB
with modA for assist with UB. While sitting EOB pt donned socks with Cosme. Sit
to stand completed from bed level with Cosme and use of w/w for UE support.
Functional mobility completed into the bathroom with CGA and use of w/w.
There she transferred on/off standard commode with Cosme and use of grab bar
for UE support. Clothing management completed with Cosme and toilet hygiene
completed with CGA for safety while standing. Pt then stood sink side while
washing her hands with CGA for safety and verbal prompts for walker safety due
to walking away without it. Functional mobility then completed back to the EOB
where she transferred sit to supine with Cosme for assist with BLE. There she
was left with call light in hand, tray table in place, and bed alarm activated
for safety. Continue with rec D/C plan to SNF.
 
MAGALIE Cao/JUDSON

## 2019-09-30 NOTE — NUR
PHYSICAL THERAPY
PT SUPINE IN BED WITH BED ALARM ON UPON ARRIVAL. PT IDENTIFIED BY NAME AND
. PT AGREEED TO ALL PT TREATMENT THIS A.M. PT PERFORMED SUPINE TO SITTING
EOB WITH MODa X1 AND VC'S TO USE BED RAIL. PT PERFORMED SIT TO STAND FROM EOB
TO FWW WITH Vinita X1 AND VC'S FOR SAFETY PUSHING OFF BED AND NOT USIGN WALKER
TO STAND. PT GAIT TRAINED 15FT X2 WITH FWW AND Vinita X1, SHUFFLED GAT AND
SLIGHT FW FLEXED POSTURE. PT PERFORMED STS TO AND FROM TOILET WITH Vinita AND
USE ON ONE HAND RAIL. PT PERFORMED STS TO EOB WITH VC'S FOR SAFETY AND
CGA. PT PERFORMESD SIT TO SUPINE WITH MODa X1 WITH VC'S FOR SAFETY. PT
REQUIRED MAXa X2 FOR BED MOBILITY TO MOVE HEAD OF BED. PT SUPINE IN BED WITH
CALL LIGHT SITTING ON PTS CHEST AND BED ALARM ON. PT REPORTED NO OTHER NEEDS
AT TIME TIME. PT SEEN 1:1 FOR 16MIN THIS AM. GEOVANNY CORREA PTA

## 2019-09-30 NOTE — NUR
PT STATES SHE WOULD LIKE TO GO TO [facility] ON DISCHARGE.  DISCHARGE PLANNER AWARE.
WILL CONTINUE TO FOLLOW.

## 2019-09-30 NOTE — NUR
Patient requesting a referral to [facility]. Contacted facility and faxed referral.
Patient has met her 3 night stay. Waiting on review/acceptance.

## 2019-09-30 NOTE — NUR
patient has been accepted to [facility] exemption
complete, 3 night stay complete. Patient is ok to go when medically stable for
discharge.

## 2019-10-01 VITALS — DIASTOLIC BLOOD PRESSURE: 56 MMHG | SYSTOLIC BLOOD PRESSURE: 158 MMHG

## 2019-10-01 VITALS — DIASTOLIC BLOOD PRESSURE: 78 MMHG | SYSTOLIC BLOOD PRESSURE: 176 MMHG

## 2019-10-01 VITALS — DIASTOLIC BLOOD PRESSURE: 62 MMHG

## 2019-10-01 VITALS — DIASTOLIC BLOOD PRESSURE: 96 MMHG | SYSTOLIC BLOOD PRESSURE: 156 MMHG

## 2019-10-01 VITALS — DIASTOLIC BLOOD PRESSURE: 70 MMHG

## 2019-10-01 NOTE — NUR
TRANSPORT STAFF FROM [facility] IN TO TAKE PATIENT TO [facility] BY AMBULETTE, TO FRONT
LOBBY BY WHEELCHAIR TO PLACE IN VAN.  REPORT CALLED TO [facility], MESSAGE LEFT WITH
SUPERVISOR INCLUDING CONTACT INFORMATION FOR QUESTIONS.

## 2019-10-01 NOTE — NUR
Patient accepted to [facility]; 3 night stay complete. patient is ok
to go when medically stable for discharge.

## 2019-10-01 NOTE — NUR
PHYSICAL THERAPY CO-SIGN
 
 
I approve of the Physical Therapy notes written above.
 
                                MARIAH CASTELAN PT, DPT

## 2019-10-01 NOTE — NUR
Patient is discharged to [facility]; [facility]able will be picking patient up at 1 pm
with their ambulette. Nursing/rodriguez clerk and patients nephew Kaushal all
notified.

## 2019-10-02 NOTE — NUR
OCCUPATIONAL THERAPY CO-SIGN
 
I approve of the Occupational Therapy notes written above.
PRIYA VARELA OTR/JUDSON

## 2019-10-21 ENCOUNTER — HOSPITAL ENCOUNTER (OUTPATIENT)
Dept: HOSPITAL 83 - NM | Age: 84
Discharge: HOME | End: 2019-10-21
Attending: INTERNAL MEDICINE
Payer: MEDICARE

## 2019-10-21 DIAGNOSIS — I10: ICD-10-CM

## 2019-10-21 DIAGNOSIS — K31.84: Primary | ICD-10-CM

## 2019-11-21 ENCOUNTER — HOSPITAL ENCOUNTER (INPATIENT)
Dept: HOSPITAL 83 - ED | Age: 84
LOS: 5 days | Discharge: HOME HEALTH | DRG: 377 | End: 2019-11-26
Attending: INTERNAL MEDICINE | Admitting: INTERNAL MEDICINE
Payer: MEDICARE

## 2019-11-21 VITALS — BODY MASS INDEX: 21.93 KG/M2 | HEIGHT: 61.97 IN | WEIGHT: 119.19 LBS

## 2019-11-21 VITALS — DIASTOLIC BLOOD PRESSURE: 54 MMHG

## 2019-11-21 VITALS — SYSTOLIC BLOOD PRESSURE: 149 MMHG | DIASTOLIC BLOOD PRESSURE: 69 MMHG

## 2019-11-21 VITALS — DIASTOLIC BLOOD PRESSURE: 58 MMHG | SYSTOLIC BLOOD PRESSURE: 146 MMHG

## 2019-11-21 VITALS — DIASTOLIC BLOOD PRESSURE: 60 MMHG

## 2019-11-21 DIAGNOSIS — Z98.41: ICD-10-CM

## 2019-11-21 DIAGNOSIS — Z79.899: ICD-10-CM

## 2019-11-21 DIAGNOSIS — Z87.01: ICD-10-CM

## 2019-11-21 DIAGNOSIS — I48.91: ICD-10-CM

## 2019-11-21 DIAGNOSIS — M25.511: ICD-10-CM

## 2019-11-21 DIAGNOSIS — N17.0: ICD-10-CM

## 2019-11-21 DIAGNOSIS — Z84.89: ICD-10-CM

## 2019-11-21 DIAGNOSIS — Z86.73: ICD-10-CM

## 2019-11-21 DIAGNOSIS — R73.9: ICD-10-CM

## 2019-11-21 DIAGNOSIS — E44.0: ICD-10-CM

## 2019-11-21 DIAGNOSIS — I11.0: ICD-10-CM

## 2019-11-21 DIAGNOSIS — Z82.49: ICD-10-CM

## 2019-11-21 DIAGNOSIS — I50.22: ICD-10-CM

## 2019-11-21 DIAGNOSIS — E78.5: ICD-10-CM

## 2019-11-21 DIAGNOSIS — Z88.2: ICD-10-CM

## 2019-11-21 DIAGNOSIS — Z51.5: ICD-10-CM

## 2019-11-21 DIAGNOSIS — E87.8: ICD-10-CM

## 2019-11-21 DIAGNOSIS — Z66: ICD-10-CM

## 2019-11-21 DIAGNOSIS — K92.2: Primary | ICD-10-CM

## 2019-11-21 DIAGNOSIS — I08.0: ICD-10-CM

## 2019-11-21 LAB
ALBUMIN SERPL-MCNC: 3.1 GM/DL (ref 3.1–4.5)
ALP SERPL-CCNC: 47 U/L (ref 45–117)
ALT SERPL W P-5'-P-CCNC: 15 U/L (ref 12–78)
APTT PPP: 24.1 SECONDS (ref 20–32.1)
AST SERPL-CCNC: 16 IU/L (ref 3–35)
BASOPHILS # BLD AUTO: 0 10*3/UL (ref 0–0.1)
BASOPHILS NFR BLD AUTO: 0.5 % (ref 0–1)
BUN SERPL-MCNC: 32 MG/DL (ref 7–24)
CHLORIDE SERPL-SCNC: 115 MMOL/L (ref 98–107)
CREAT SERPL-MCNC: 1.2 MG/DL (ref 0.55–1.02)
EOSINOPHIL # BLD AUTO: 0.1 10*3/UL (ref 0–0.4)
EOSINOPHIL # BLD AUTO: 0.8 % (ref 1–4)
ERYTHROCYTE [DISTWIDTH] IN BLOOD BY AUTOMATED COUNT: 19.7 % (ref 0–14.5)
HCT VFR BLD AUTO: 42.1 % (ref 37–47)
HGB BLD-MCNC: 12.7 G/DL (ref 12–16)
INR BLD: 1 (ref 2–3.5)
LYMPHOCYTES # BLD AUTO: 1.2 10*3/UL (ref 1.3–4.4)
LYMPHOCYTES NFR BLD AUTO: 19.6 % (ref 27–41)
MCH RBC QN AUTO: 27.3 PG (ref 27–31)
MCHC RBC AUTO-ENTMCNC: 30.2 G/DL (ref 33–37)
MCV RBC AUTO: 90.3 FL (ref 81–99)
MONOCYTES # BLD AUTO: 0.5 10*3/UL (ref 0.1–1)
MONOCYTES NFR BLD MANUAL: 7.7 % (ref 3–9)
NEUT #: 4.2 10*3/UL (ref 2.3–7.9)
NEUT %: 70.4 % (ref 47–73)
NRBC BLD QL AUTO: 0 % (ref 0–0)
PLATELET # BLD AUTO: 172 10*3/UL (ref 130–400)
PMV BLD AUTO: 10.7 FL (ref 9.6–12.3)
POTASSIUM SERPL-SCNC: 4.7 MMOL/L (ref 3.5–5.1)
PROT SERPL-MCNC: 7.1 GM/DL (ref 6.4–8.2)
RBC # BLD AUTO: 4.66 10*6/UL (ref 4.1–5.1)
SODIUM SERPL-SCNC: 140 MMOL/L (ref 136–145)
TROPONIN I SERPL-MCNC: < 0.015 NG/ML (ref ?–0.04)
WBC NRBC COR # BLD AUTO: 6 10*3/UL (ref 4.8–10.8)

## 2019-11-22 VITALS — SYSTOLIC BLOOD PRESSURE: 122 MMHG | DIASTOLIC BLOOD PRESSURE: 78 MMHG

## 2019-11-22 VITALS — SYSTOLIC BLOOD PRESSURE: 151 MMHG | DIASTOLIC BLOOD PRESSURE: 77 MMHG

## 2019-11-22 VITALS — DIASTOLIC BLOOD PRESSURE: 46 MMHG

## 2019-11-22 VITALS — DIASTOLIC BLOOD PRESSURE: 54 MMHG

## 2019-11-22 VITALS — DIASTOLIC BLOOD PRESSURE: 71 MMHG | SYSTOLIC BLOOD PRESSURE: 110 MMHG

## 2019-11-22 LAB
BASOPHILS # BLD AUTO: 0 10*3/UL (ref 0–0.1)
BASOPHILS NFR BLD AUTO: 0.3 % (ref 0–1)
BUN SERPL-MCNC: 30 MG/DL (ref 7–24)
CHLORIDE SERPL-SCNC: 114 MMOL/L (ref 98–107)
CHOLEST SERPL-MCNC: 124 MG/DL (ref ?–200)
CREAT SERPL-MCNC: 0.93 MG/DL (ref 0.55–1.02)
EOSINOPHIL # BLD AUTO: 0.1 10*3/UL (ref 0–0.4)
EOSINOPHIL # BLD AUTO: 1.2 % (ref 1–4)
ERYTHROCYTE [DISTWIDTH] IN BLOOD BY AUTOMATED COUNT: 19.5 % (ref 0–14.5)
HCT VFR BLD AUTO: 40 % (ref 37–47)
HDLC SERPL-MCNC: 23 MG/DL (ref 40–60)
HGB BLD-MCNC: 12.2 G/DL (ref 12–16)
LDLC SERPL DIRECT ASSAY-MCNC: 87 MG/DL (ref 9–159)
LYMPHOCYTES # BLD AUTO: 1.4 10*3/UL (ref 1.3–4.4)
LYMPHOCYTES NFR BLD AUTO: 22.1 % (ref 27–41)
MCH RBC QN AUTO: 26.6 PG (ref 27–31)
MCHC RBC AUTO-ENTMCNC: 30.5 G/DL (ref 33–37)
MCV RBC AUTO: 87.3 FL (ref 81–99)
MONOCYTES # BLD AUTO: 0.6 10*3/UL (ref 0.1–1)
MONOCYTES NFR BLD MANUAL: 8.5 % (ref 3–9)
NEUT #: 4.3 10*3/UL (ref 2.3–7.9)
NEUT %: 66.5 % (ref 47–73)
NRBC BLD QL AUTO: 0 10*3/UL (ref 0–0)
PLATELET # BLD AUTO: 177 10*3/UL (ref 130–400)
PMV BLD AUTO: 11.3 FL (ref 9.6–12.3)
POTASSIUM SERPL-SCNC: 4.6 MMOL/L (ref 3.5–5.1)
RBC # BLD AUTO: 4.58 10*6/UL (ref 4.1–5.1)
SODIUM SERPL-SCNC: 141 MMOL/L (ref 136–145)
TRIGL SERPL-MCNC: 68 MG/DL (ref ?–150)
VLDLC SERPL CALC-MCNC: 14 MG/DL (ref 6–40)
WBC NRBC COR # BLD AUTO: 6.5 10*3/UL (ref 4.8–10.8)

## 2019-11-23 VITALS — SYSTOLIC BLOOD PRESSURE: 103 MMHG | DIASTOLIC BLOOD PRESSURE: 55 MMHG

## 2019-11-23 VITALS — DIASTOLIC BLOOD PRESSURE: 58 MMHG

## 2019-11-23 VITALS — SYSTOLIC BLOOD PRESSURE: 112 MMHG | DIASTOLIC BLOOD PRESSURE: 46 MMHG

## 2019-11-23 VITALS — DIASTOLIC BLOOD PRESSURE: 57 MMHG

## 2019-11-23 VITALS — SYSTOLIC BLOOD PRESSURE: 150 MMHG | DIASTOLIC BLOOD PRESSURE: 76 MMHG

## 2019-11-23 LAB
BASOPHILS # BLD AUTO: 0 10*3/UL (ref 0–0.1)
BASOPHILS NFR BLD AUTO: 0.3 % (ref 0–1)
BUN SERPL-MCNC: 24 MG/DL (ref 7–24)
CHLORIDE SERPL-SCNC: 111 MMOL/L (ref 98–107)
CREAT SERPL-MCNC: 1.2 MG/DL (ref 0.55–1.02)
EOSINOPHIL # BLD AUTO: 0.1 10*3/UL (ref 0–0.4)
EOSINOPHIL # BLD AUTO: 1.8 % (ref 1–4)
ERYTHROCYTE [DISTWIDTH] IN BLOOD BY AUTOMATED COUNT: 19.6 % (ref 0–14.5)
HCT VFR BLD AUTO: 35.7 % (ref 37–47)
HGB BLD-MCNC: 11.2 G/DL (ref 12–16)
LYMPHOCYTES # BLD AUTO: 1.5 10*3/UL (ref 1.3–4.4)
LYMPHOCYTES NFR BLD AUTO: 25 % (ref 27–41)
MCH RBC QN AUTO: 27.7 PG (ref 27–31)
MCHC RBC AUTO-ENTMCNC: 31.4 G/DL (ref 33–37)
MCV RBC AUTO: 88.4 FL (ref 81–99)
MONOCYTES # BLD AUTO: 0.6 10*3/UL (ref 0.1–1)
MONOCYTES NFR BLD MANUAL: 9.2 % (ref 3–9)
NEUT #: 3.7 10*3/UL (ref 2.3–7.9)
NEUT %: 62.2 % (ref 47–73)
NRBC BLD QL AUTO: 0 10*3/UL (ref 0–0)
PLATELET # BLD AUTO: 165 10*3/UL (ref 130–400)
PMV BLD AUTO: 11.2 FL (ref 9.6–12.3)
POTASSIUM SERPL-SCNC: 4.6 MMOL/L (ref 3.5–5.1)
RBC # BLD AUTO: 4.04 10*6/UL (ref 4.1–5.1)
SODIUM SERPL-SCNC: 140 MMOL/L (ref 136–145)
WBC NRBC COR # BLD AUTO: 6 10*3/UL (ref 4.8–10.8)

## 2019-11-24 VITALS — SYSTOLIC BLOOD PRESSURE: 148 MMHG | DIASTOLIC BLOOD PRESSURE: 80 MMHG

## 2019-11-24 VITALS — DIASTOLIC BLOOD PRESSURE: 53 MMHG | SYSTOLIC BLOOD PRESSURE: 124 MMHG

## 2019-11-24 VITALS — DIASTOLIC BLOOD PRESSURE: 52 MMHG

## 2019-11-24 VITALS — DIASTOLIC BLOOD PRESSURE: 67 MMHG

## 2019-11-24 VITALS — DIASTOLIC BLOOD PRESSURE: 46 MMHG

## 2019-11-24 LAB
BASOPHILS # BLD AUTO: 0 10*3/UL (ref 0–0.1)
BASOPHILS NFR BLD AUTO: 0.5 % (ref 0–1)
BUN SERPL-MCNC: 24 MG/DL (ref 7–24)
CHLORIDE SERPL-SCNC: 113 MMOL/L (ref 98–107)
CREAT SERPL-MCNC: 1.07 MG/DL (ref 0.55–1.02)
EOSINOPHIL # BLD AUTO: 0.1 10*3/UL (ref 0–0.4)
EOSINOPHIL # BLD AUTO: 0.9 % (ref 1–4)
ERYTHROCYTE [DISTWIDTH] IN BLOOD BY AUTOMATED COUNT: 19.5 % (ref 0–14.5)
HCT VFR BLD AUTO: 36.7 % (ref 37–47)
HGB BLD-MCNC: 11.5 G/DL (ref 12–16)
LYMPHOCYTES # BLD AUTO: 1.4 10*3/UL (ref 1.3–4.4)
LYMPHOCYTES NFR BLD AUTO: 21.5 % (ref 27–41)
MCH RBC QN AUTO: 27.3 PG (ref 27–31)
MCHC RBC AUTO-ENTMCNC: 31.3 G/DL (ref 33–37)
MCV RBC AUTO: 87.2 FL (ref 81–99)
MONOCYTES # BLD AUTO: 0.4 10*3/UL (ref 0.1–1)
MONOCYTES NFR BLD MANUAL: 6.5 % (ref 3–9)
NEUT #: 4.5 10*3/UL (ref 2.3–7.9)
NEUT %: 69.4 % (ref 47–73)
NRBC BLD QL AUTO: 0 10*3/UL (ref 0–0)
PLATELET # BLD AUTO: 171 10*3/UL (ref 130–400)
PMV BLD AUTO: 11.2 FL (ref 9.6–12.3)
POTASSIUM SERPL-SCNC: 4.5 MMOL/L (ref 3.5–5.1)
RBC # BLD AUTO: 4.21 10*6/UL (ref 4.1–5.1)
SODIUM SERPL-SCNC: 141 MMOL/L (ref 136–145)
WBC NRBC COR # BLD AUTO: 6.4 10*3/UL (ref 4.8–10.8)

## 2019-11-25 VITALS — DIASTOLIC BLOOD PRESSURE: 56 MMHG

## 2019-11-25 VITALS — SYSTOLIC BLOOD PRESSURE: 127 MMHG | DIASTOLIC BLOOD PRESSURE: 55 MMHG

## 2019-11-25 VITALS — DIASTOLIC BLOOD PRESSURE: 53 MMHG | SYSTOLIC BLOOD PRESSURE: 128 MMHG

## 2019-11-25 VITALS — DIASTOLIC BLOOD PRESSURE: 53 MMHG

## 2019-11-25 VITALS — DIASTOLIC BLOOD PRESSURE: 60 MMHG

## 2019-11-25 LAB
BASOPHILS # BLD AUTO: 0 10*3/UL (ref 0–0.1)
BASOPHILS NFR BLD AUTO: 0.6 % (ref 0–1)
BUN SERPL-MCNC: 25 MG/DL (ref 7–24)
CHLORIDE SERPL-SCNC: 110 MMOL/L (ref 98–107)
CREAT SERPL-MCNC: 1.19 MG/DL (ref 0.55–1.02)
EOSINOPHIL # BLD AUTO: 0.1 10*3/UL (ref 0–0.4)
EOSINOPHIL # BLD AUTO: 1.6 % (ref 1–4)
ERYTHROCYTE [DISTWIDTH] IN BLOOD BY AUTOMATED COUNT: 19.8 % (ref 0–14.5)
HCT VFR BLD AUTO: 37.4 % (ref 37–47)
HGB BLD-MCNC: 11.8 G/DL (ref 12–16)
LYMPHOCYTES # BLD AUTO: 1.4 10*3/UL (ref 1.3–4.4)
LYMPHOCYTES NFR BLD AUTO: 22.5 % (ref 27–41)
MCH RBC QN AUTO: 27.5 PG (ref 27–31)
MCHC RBC AUTO-ENTMCNC: 31.6 G/DL (ref 33–37)
MCV RBC AUTO: 87.2 FL (ref 81–99)
MONOCYTES # BLD AUTO: 0.4 10*3/UL (ref 0.1–1)
MONOCYTES NFR BLD MANUAL: 6.7 % (ref 3–9)
NEUT #: 4.2 10*3/UL (ref 2.3–7.9)
NEUT %: 67.2 % (ref 47–73)
NRBC BLD QL AUTO: 0 % (ref 0–0)
PLATELET # BLD AUTO: 182 10*3/UL (ref 130–400)
PMV BLD AUTO: 11.1 FL (ref 9.6–12.3)
POTASSIUM SERPL-SCNC: 4.3 MMOL/L (ref 3.5–5.1)
RBC # BLD AUTO: 4.29 10*6/UL (ref 4.1–5.1)
SODIUM SERPL-SCNC: 140 MMOL/L (ref 136–145)
WBC NRBC COR # BLD AUTO: 6.3 10*3/UL (ref 4.8–10.8)

## 2019-11-26 VITALS — DIASTOLIC BLOOD PRESSURE: 50 MMHG | SYSTOLIC BLOOD PRESSURE: 136 MMHG

## 2019-11-26 VITALS — DIASTOLIC BLOOD PRESSURE: 57 MMHG | SYSTOLIC BLOOD PRESSURE: 153 MMHG

## 2019-11-26 LAB
BASOPHILS # BLD AUTO: 0 10*3/UL (ref 0–0.1)
BASOPHILS NFR BLD AUTO: 0.4 % (ref 0–1)
BUN SERPL-MCNC: 24 MG/DL (ref 7–24)
CHLORIDE SERPL-SCNC: 110 MMOL/L (ref 98–107)
CREAT SERPL-MCNC: 1.17 MG/DL (ref 0.55–1.02)
EOSINOPHIL # BLD AUTO: 0.1 10*3/UL (ref 0–0.4)
EOSINOPHIL # BLD AUTO: 0.9 % (ref 1–4)
ERYTHROCYTE [DISTWIDTH] IN BLOOD BY AUTOMATED COUNT: 19.8 % (ref 0–14.5)
HCT VFR BLD AUTO: 38.6 % (ref 37–47)
HGB BLD-MCNC: 12.1 G/DL (ref 12–16)
LYMPHOCYTES # BLD AUTO: 1.6 10*3/UL (ref 1.3–4.4)
LYMPHOCYTES NFR BLD AUTO: 23.7 % (ref 27–41)
MCH RBC QN AUTO: 27.5 PG (ref 27–31)
MCHC RBC AUTO-ENTMCNC: 31.3 G/DL (ref 33–37)
MCV RBC AUTO: 87.7 FL (ref 81–99)
MONOCYTES # BLD AUTO: 0.4 10*3/UL (ref 0.1–1)
MONOCYTES NFR BLD MANUAL: 6.1 % (ref 3–9)
NEUT #: 4.6 10*3/UL (ref 2.3–7.9)
NEUT %: 67.6 % (ref 47–73)
NRBC BLD QL AUTO: 0 10*3/UL (ref 0–0)
PLATELET # BLD AUTO: 197 10*3/UL (ref 130–400)
PMV BLD AUTO: 10.4 FL (ref 9.6–12.3)
POTASSIUM SERPL-SCNC: 4.1 MMOL/L (ref 3.5–5.1)
RBC # BLD AUTO: 4.4 10*6/UL (ref 4.1–5.1)
SODIUM SERPL-SCNC: 137 MMOL/L (ref 136–145)
WBC NRBC COR # BLD AUTO: 6.8 10*3/UL (ref 4.8–10.8)

## 2019-12-11 ENCOUNTER — HOSPITAL ENCOUNTER (INPATIENT)
Dept: HOSPITAL 83 - ED | Age: 84
LOS: 6 days | Discharge: OTHER INSTITUTION | DRG: 689 | End: 2019-12-17
Attending: STUDENT IN AN ORGANIZED HEALTH CARE EDUCATION/TRAINING PROGRAM | Admitting: STUDENT IN AN ORGANIZED HEALTH CARE EDUCATION/TRAINING PROGRAM
Payer: MEDICARE

## 2019-12-11 VITALS — HEIGHT: 61.97 IN | WEIGHT: 115.19 LBS | BODY MASS INDEX: 21.2 KG/M2

## 2019-12-11 VITALS — DIASTOLIC BLOOD PRESSURE: 90 MMHG

## 2019-12-11 VITALS — DIASTOLIC BLOOD PRESSURE: 78 MMHG

## 2019-12-11 VITALS — DIASTOLIC BLOOD PRESSURE: 67 MMHG | SYSTOLIC BLOOD PRESSURE: 143 MMHG

## 2019-12-11 DIAGNOSIS — R91.1: ICD-10-CM

## 2019-12-11 DIAGNOSIS — Z88.2: ICD-10-CM

## 2019-12-11 DIAGNOSIS — I48.21: ICD-10-CM

## 2019-12-11 DIAGNOSIS — B96.20: ICD-10-CM

## 2019-12-11 DIAGNOSIS — E44.0: ICD-10-CM

## 2019-12-11 DIAGNOSIS — J98.11: ICD-10-CM

## 2019-12-11 DIAGNOSIS — R59.0: ICD-10-CM

## 2019-12-11 DIAGNOSIS — Z98.41: ICD-10-CM

## 2019-12-11 DIAGNOSIS — Z86.73: ICD-10-CM

## 2019-12-11 DIAGNOSIS — N30.00: Primary | ICD-10-CM

## 2019-12-11 DIAGNOSIS — D64.9: ICD-10-CM

## 2019-12-11 DIAGNOSIS — E87.8: ICD-10-CM

## 2019-12-11 DIAGNOSIS — R00.1: ICD-10-CM

## 2019-12-11 DIAGNOSIS — I11.0: ICD-10-CM

## 2019-12-11 DIAGNOSIS — I08.0: ICD-10-CM

## 2019-12-11 DIAGNOSIS — I50.43: ICD-10-CM

## 2019-12-11 DIAGNOSIS — Z79.899: ICD-10-CM

## 2019-12-11 DIAGNOSIS — Z51.5: ICD-10-CM

## 2019-12-11 DIAGNOSIS — Z66: ICD-10-CM

## 2019-12-11 DIAGNOSIS — E78.5: ICD-10-CM

## 2019-12-11 DIAGNOSIS — Z82.49: ICD-10-CM

## 2019-12-11 DIAGNOSIS — F03.90: ICD-10-CM

## 2019-12-11 DIAGNOSIS — Z84.89: ICD-10-CM

## 2019-12-11 LAB
ALBUMIN SERPL-MCNC: 3.2 GM/DL (ref 3.1–4.5)
ALP SERPL-CCNC: 79 U/L (ref 45–117)
ALT SERPL W P-5'-P-CCNC: 21 U/L (ref 12–78)
APPEARANCE UR: CLEAR
APTT PPP: 24.5 SECONDS (ref 20–32.1)
AST SERPL-CCNC: 20 IU/L (ref 3–35)
BACTERIA #/AREA URNS HPF: (no result) /[HPF]
BASOPHILS # BLD AUTO: 0 10*3/UL (ref 0–0.1)
BASOPHILS NFR BLD AUTO: 0.5 % (ref 0–1)
BILIRUB UR QL STRIP: NEGATIVE
BUN SERPL-MCNC: 19 MG/DL (ref 7–24)
CHLORIDE SERPL-SCNC: 114 MMOL/L (ref 98–107)
COLOR UR: YELLOW
CREAT SERPL-MCNC: 0.97 MG/DL (ref 0.55–1.02)
EOSINOPHIL # BLD AUTO: 0.1 10*3/UL (ref 0–0.4)
EOSINOPHIL # BLD AUTO: 1.1 % (ref 1–4)
ERYTHROCYTE [DISTWIDTH] IN BLOOD BY AUTOMATED COUNT: 20.4 % (ref 0–14.5)
GLUCOSE UR QL: NEGATIVE
HCT VFR BLD AUTO: 34.6 % (ref 37–47)
HGB BLD-MCNC: 11 G/DL (ref 12–16)
HGB UR QL STRIP: NEGATIVE
INR BLD: 1 (ref 2–3.5)
KETONES UR QL STRIP: NEGATIVE
LEUKOCYTE ESTERASE UR QL STRIP: (no result)
LIPASE SERPL-CCNC: 171 U/L (ref 73–393)
LYMPHOCYTES # BLD AUTO: 1.4 10*3/UL (ref 1.3–4.4)
LYMPHOCYTES NFR BLD AUTO: 23.4 % (ref 27–41)
MCH RBC QN AUTO: 27.9 PG (ref 27–31)
MCHC RBC AUTO-ENTMCNC: 31.8 G/DL (ref 33–37)
MCV RBC AUTO: 87.8 FL (ref 81–99)
MONOCYTES # BLD AUTO: 0.5 10*3/UL (ref 0.1–1)
MONOCYTES NFR BLD MANUAL: 8.3 % (ref 3–9)
NEUT #: 4 10*3/UL (ref 2.3–7.9)
NEUT %: 65.7 % (ref 47–73)
NITRITE UR QL STRIP: NEGATIVE
NRBC BLD QL AUTO: 0 10*3/UL (ref 0–0)
PH UR STRIP: 6.5 [PH] (ref 5–9)
PLATELET # BLD AUTO: 261 10*3/UL (ref 130–400)
PMV BLD AUTO: 10.6 FL (ref 9.6–12.3)
POTASSIUM SERPL-SCNC: 4.3 MMOL/L (ref 3.5–5.1)
PROT SERPL-MCNC: 7 GM/DL (ref 6.4–8.2)
RBC # BLD AUTO: 3.94 10*6/UL (ref 4.1–5.1)
SODIUM SERPL-SCNC: 141 MMOL/L (ref 136–145)
SP GR UR: 1.01 (ref 1–1.03)
TROPONIN I SERPL-MCNC: 0.02 NG/ML (ref ?–0.04)
UROBILINOGEN UR STRIP-MCNC: 0.2 E.U./DL (ref 0.2–1)
WBC #/AREA URNS HPF: (no result) WBC/HPF (ref 0–5)
WBC NRBC COR # BLD AUTO: 6.2 10*3/UL (ref 4.8–10.8)

## 2019-12-11 NOTE — NUR
Time: 1830
A 89  year old FEMALE admitted to 5E
under services of JARRETT ALVARADO DO.
Pt. arrived via BED
 from
ER.  Chief complaint: WEAK, UNALBE TO AMBULATE, RECENT UTI.
 
FORREST ASIF

## 2019-12-12 VITALS — DIASTOLIC BLOOD PRESSURE: 54 MMHG | SYSTOLIC BLOOD PRESSURE: 132 MMHG

## 2019-12-12 VITALS — SYSTOLIC BLOOD PRESSURE: 150 MMHG | DIASTOLIC BLOOD PRESSURE: 68 MMHG

## 2019-12-12 VITALS — SYSTOLIC BLOOD PRESSURE: 117 MMHG | DIASTOLIC BLOOD PRESSURE: 52 MMHG

## 2019-12-12 VITALS — DIASTOLIC BLOOD PRESSURE: 64 MMHG

## 2019-12-12 VITALS — DIASTOLIC BLOOD PRESSURE: 66 MMHG

## 2019-12-12 LAB
25(OH)D3 SERPL-MCNC: 33.5 NG/ML (ref 30–100)
ALBUMIN SERPL-MCNC: 3 GM/DL (ref 3.1–4.5)
ALP SERPL-CCNC: 76 U/L (ref 45–117)
ALT SERPL W P-5'-P-CCNC: 20 U/L (ref 12–78)
AST SERPL-CCNC: 19 IU/L (ref 3–35)
BASOPHILS # BLD AUTO: 0 10*3/UL (ref 0–0.1)
BASOPHILS NFR BLD AUTO: 0.5 % (ref 0–1)
BUN SERPL-MCNC: 17 MG/DL (ref 7–24)
CHLORIDE SERPL-SCNC: 113 MMOL/L (ref 98–107)
CREAT SERPL-MCNC: 0.92 MG/DL (ref 0.55–1.02)
EOSINOPHIL # BLD AUTO: 0.1 10*3/UL (ref 0–0.4)
EOSINOPHIL # BLD AUTO: 1.5 % (ref 1–4)
ERYTHROCYTE [DISTWIDTH] IN BLOOD BY AUTOMATED COUNT: 20.4 % (ref 0–14.5)
HCT VFR BLD AUTO: 36.2 % (ref 37–47)
HGB BLD-MCNC: 11.3 G/DL (ref 12–16)
LYMPHOCYTES # BLD AUTO: 1.3 10*3/UL (ref 1.3–4.4)
LYMPHOCYTES NFR BLD AUTO: 19.7 % (ref 27–41)
MCH RBC QN AUTO: 27.3 PG (ref 27–31)
MCHC RBC AUTO-ENTMCNC: 31.2 G/DL (ref 33–37)
MCV RBC AUTO: 87.4 FL (ref 81–99)
MONOCYTES # BLD AUTO: 0.5 10*3/UL (ref 0.1–1)
MONOCYTES NFR BLD MANUAL: 7.6 % (ref 3–9)
NEUT #: 4.6 10*3/UL (ref 2.3–7.9)
NEUT %: 69.8 % (ref 47–73)
NRBC BLD QL AUTO: 0 % (ref 0–0)
PHOSPHATE SERPL-MCNC: 2.9 MG/DL (ref 2.5–4.9)
PLATELET # BLD AUTO: 248 10*3/UL (ref 130–400)
PMV BLD AUTO: 10.7 FL (ref 9.6–12.3)
POTASSIUM SERPL-SCNC: 3.9 MMOL/L (ref 3.5–5.1)
PROT SERPL-MCNC: 6.8 GM/DL (ref 6.4–8.2)
RBC # BLD AUTO: 4.14 10*6/UL (ref 4.1–5.1)
SODIUM SERPL-SCNC: 142 MMOL/L (ref 136–145)
T4 FREE SERPL-MCNC: 1.18 NG/DL (ref 0.76–1.46)
TSH SERPL DL<=0.005 MIU/L-ACNC: 1.79 UIU/ML (ref 0.36–4.75)
VITAMIN B12: 258 PG/ML (ref 247–911)
WBC NRBC COR # BLD AUTO: 6.6 10*3/UL (ref 4.8–10.8)

## 2019-12-12 NOTE — NUR
Occupational Therapy evaluation completed on 5 with full eval to follow.
Precautions include fall risk, new ww use,IV UE, impaired cognition,unsteady
in standing,moderate complexity level 06638. Recommend OT for safety in
functional mobility in ADLs,ADL training per POC and SNF to enable return home
alone. Thank you. Krystina Barroso OTR/L

## 2019-12-12 NOTE — NUR
PHYSICAL THERAPY
 
Bakari completed full report to follow pt moderate complexity level 00539
recomend SNF at discharge. PT to work on transfers,amb,balance,safety and
strengthening.
 
 
Sadai Gunderson PT

## 2019-12-12 NOTE — NUR
in to talk to patient.
Patient states lives at HOME with ALONE.
There are FEW steps in the home.
Physician: SEUN
Pharmacy: [facility]
Home health services: [facility]
Patient's level of ADLs: MINIMAL ASSIST
Patient has working utilities: ES
DME: WALKER
Follow-up physician's appointment after d/c: PREFERS TO MAKE OWN
Does patient want to access PORTAL?: NO
Discharge plan PT LIVES AT HOME ALONE WITH ASSIST FROM NEPHEWS.  PT HAS AGREED
TO GO TO [facility] ON DISCHARGE.  REFERRAL HAS BEEN MADE AND SHE HAS BEEN
ACCEPTED. WILL CONTINUE TO MONITORED, WILL REQUIRE A 3 NIGHT STAY PRIOR TO
DISCHARGE.  WILL CONTINUE TO FOLLOW.  .
 
PARAG VALDEZ

## 2019-12-12 NOTE — NUR
Patient has been accepted at [facility]. Patient will require a 3 night stay.
Patient will be able to go to [facility] on Saturday 12/14/2019 if medically stable.
-PABLITO Mittal

## 2019-12-12 NOTE — NUR
LAUREENW was notified of the patient wanting to be referred to [facility]. LAUREENW faxed new
referral to [facility]. -PABLITO Mittal

## 2019-12-13 VITALS — SYSTOLIC BLOOD PRESSURE: 142 MMHG | DIASTOLIC BLOOD PRESSURE: 82 MMHG

## 2019-12-13 VITALS — DIASTOLIC BLOOD PRESSURE: 72 MMHG

## 2019-12-13 VITALS — DIASTOLIC BLOOD PRESSURE: 86 MMHG | SYSTOLIC BLOOD PRESSURE: 134 MMHG

## 2019-12-13 VITALS — DIASTOLIC BLOOD PRESSURE: 62 MMHG | SYSTOLIC BLOOD PRESSURE: 144 MMHG

## 2019-12-13 VITALS — DIASTOLIC BLOOD PRESSURE: 79 MMHG

## 2019-12-13 LAB
BASOPHILS # BLD AUTO: 0 10*3/UL (ref 0–0.1)
BASOPHILS NFR BLD AUTO: 0.5 % (ref 0–1)
BUN SERPL-MCNC: 13 MG/DL (ref 7–24)
CHLORIDE SERPL-SCNC: 113 MMOL/L (ref 98–107)
CREAT SERPL-MCNC: 0.92 MG/DL (ref 0.55–1.02)
EOSINOPHIL # BLD AUTO: 0.1 10*3/UL (ref 0–0.4)
EOSINOPHIL # BLD AUTO: 1.7 % (ref 1–4)
ERYTHROCYTE [DISTWIDTH] IN BLOOD BY AUTOMATED COUNT: 20.7 % (ref 0–14.5)
HCT VFR BLD AUTO: 36.4 % (ref 37–47)
HGB BLD-MCNC: 11.3 G/DL (ref 12–16)
LYMPHOCYTES # BLD AUTO: 1.3 10*3/UL (ref 1.3–4.4)
LYMPHOCYTES NFR BLD AUTO: 20.2 % (ref 27–41)
MCH RBC QN AUTO: 27.8 PG (ref 27–31)
MCHC RBC AUTO-ENTMCNC: 31 G/DL (ref 33–37)
MCV RBC AUTO: 89.4 FL (ref 81–99)
MONOCYTES # BLD AUTO: 0.6 10*3/UL (ref 0.1–1)
MONOCYTES NFR BLD MANUAL: 9.2 % (ref 3–9)
NEUT #: 4.3 10*3/UL (ref 2.3–7.9)
NEUT %: 67.1 % (ref 47–73)
NRBC BLD QL AUTO: 0 10*3/UL (ref 0–0)
PLATELET # BLD AUTO: 218 10*3/UL (ref 130–400)
PMV BLD AUTO: 10.3 FL (ref 9.6–12.3)
POTASSIUM SERPL-SCNC: 3.8 MMOL/L (ref 3.5–5.1)
RBC # BLD AUTO: 4.07 10*6/UL (ref 4.1–5.1)
SODIUM SERPL-SCNC: 141 MMOL/L (ref 136–145)
WBC NRBC COR # BLD AUTO: 6.4 10*3/UL (ref 4.8–10.8)

## 2019-12-13 NOTE — NUR
PATIENTS IV LEAKING AT SITE. NEW IV STARTED IN LEFT AC ON FIRST ATTEMPT. IV
FLUIDS NOW INFUSING AT 70ML/HR. WILL CONTINUE TO MONITOR. No Repair - Repaired With Adjacent Surgical Defect Text (Leave Blank If You Do Not Want): After the excision the defect was repaired concurrently with another surgical defect which was in close approximation.

## 2019-12-13 NOTE — NUR
Patient will be able to go to [facility] 12/14/2019 if medically stable. PABLITO
completed HENs. -PABLITO Mittal

## 2019-12-13 NOTE — NUR
VITALS WITHIN NORMAL RANGES. PO2 95% R/A. ANGEL. A&O X3. HEART SOUNDS MURMUR.
LUNG SOUNDS DIMINISHED IN LLL OTHERWISE CLEAR THROUGHOUT. BOWEL SOUNDS ACTIVE
X4. ABDOMEN IS SOFT, NONTENDER, NONDISTENDED. 0/10 PAIN PER PT. SKIN TURGOR
NONTENDED. SKIN PINK, WARM, DRY, INTACT. POSITIVE PERIPHERAL PULSES. PT IS
PLEASANT AND COOPERATIVE. SHILA MACK SPCC.

## 2019-12-13 NOTE — NUR
PT WILL GO TO [facility] AFTER SHE HAS HAD A 3 NIGHT STAY.  CAN GO TOMORROW.  FAMILY
AWARE.  NURSING WILL NEED TO NOTIFY FAMILY TIME THAT PT IS DISCHARGED.

## 2019-12-13 NOTE — NUR
PHYSICAL THERAPY
Patient presented to therapy in supine with head of bed elevated and report of
no pain. Bed alarm is on. Patient gives informed consent for treatment.
Patient was identified by name and  on wristband. Patient is on IV infusing
at this time. Patient transfers supine to sitting at EOB with MIN A X 1.
Patient performed sitting on EOB with SBA. Patient sit to stand from EOB with
MIN A X 1. Patient ambulated with Wh Walker and MIN A X 1 for correcting
direction manually and verbal cues for upright posture, not letting go of
Walker handles and staying within SHALOM of Wh Walker. Patient ambulated 40' x 1
with Wh Walker and MIN A X 1. Patient transferred back to supine in bed with
MAX A X 2. Patient moved up to head of bed with MAX A X 2 with draw sheet.
Patient was left in supine in bed with head of bed elevated, call light within
reach, bed rails up and bed alarm activated. Patient was moderately confused
this morning having great difficulty staying within SHALOM of Walker, keeping
hands on Walker, and difficulty maintaining correct direction with Walker.
Patient was 1:1 with this PTA for 20 minutes total.
                                              JYOTSNA KNIGHT PTA

## 2019-12-13 NOTE — NUR
LAUREENW spoke with patients nephjoan Easley Marisol. LAUREENW gave a brief clinical
update. LAUREENW explained patient was agreeable to [facility] and would possible
discharged tomorrow if medically stable. Patients nephew is agreeable to this
plan. -PABLITO Mittal

## 2019-12-13 NOTE — NUR
PHYSICAL THERAPY CO-SIGN
 
 
I approve of the Physical Therapy notes written above.
 
   Sadia Gunderson PT

## 2019-12-13 NOTE — NUR
OT NOTE
Pt was seen this A.M. 1:1 for 15 minute OT session. Upon arrival pt was supine
in bed. Pt identified by name and  and had no complaints at this time. Pt
presented to therapy with IV to her LUE which remained in place throughout the
entire session. Pt transferred supine to sit EOB with Cosme. Sit to stand
completed from bed level with Cosme and use of w/w for UE support. Functional
mobility was then completed into the bathroom with CGA and use of w/w, pt
required modA for walker navigation and safety awareness. There she
transferred on to standard commode with Cosme and off with modA. Clothing
management completed with modA and toilet hygiene completed with maxA while
seated. Pt then transferred back into bed sit to supine with Cosme. There she
was left with call light in hand, tray table in place, and bed alarm activated
for safety. Continue with rec D/C plan SNF.
 
TIM Loredo

## 2019-12-14 VITALS — SYSTOLIC BLOOD PRESSURE: 138 MMHG | DIASTOLIC BLOOD PRESSURE: 67 MMHG

## 2019-12-14 VITALS — DIASTOLIC BLOOD PRESSURE: 66 MMHG

## 2019-12-14 VITALS — DIASTOLIC BLOOD PRESSURE: 56 MMHG

## 2019-12-14 VITALS — DIASTOLIC BLOOD PRESSURE: 50 MMHG

## 2019-12-14 VITALS — DIASTOLIC BLOOD PRESSURE: 91 MMHG

## 2019-12-14 NOTE — NUR
PATIENT AWAKE, ALERT AND CONFUSED. PATIENT HAS NO COMPLAINTS AT THIS TIME. BED
ALARM ON AND CALL LIGHT IN REACH. WILL MONITOR.

## 2019-12-14 NOTE — NUR
IV started right wrist with #22 protective cath after 1  attempts.
Site prepped with Chloroprep.
Sterile dressing applied. Patient tolerated procedure well.
 
 
MELECIO VALDEZ

## 2019-12-14 NOTE — NUR
PATIENT C/O BACK PAIN FROM LAYING IN BED, NORCO ADMINISTERED AS PRESCRIBED.
WILL MONITOR FOR EFFECTIVENESS.

## 2019-12-14 NOTE — NUR
PATIENT RESTING WITH EYES CLOSED. RESPIRATIONS EASY AND UNLABORED. BED ALARM
ON AND CALL LIGHT WITHIN REACH. WILL MONITOR.

## 2019-12-14 NOTE — NUR
PATIENT SLEEPING, RESTORIL EFFECTIVE. RESPIRATIONS EASY, NON LABORED. NO SIGNS
OF DISTRESS. BED IN LOWEST POSITION, BED ALARM ON, WILL CONTINUE TO MONITOR.

## 2019-12-15 VITALS — SYSTOLIC BLOOD PRESSURE: 130 MMHG | DIASTOLIC BLOOD PRESSURE: 61 MMHG

## 2019-12-15 VITALS — DIASTOLIC BLOOD PRESSURE: 66 MMHG

## 2019-12-15 VITALS — DIASTOLIC BLOOD PRESSURE: 59 MMHG

## 2019-12-15 VITALS — DIASTOLIC BLOOD PRESSURE: 80 MMHG

## 2019-12-15 VITALS — DIASTOLIC BLOOD PRESSURE: 70 MMHG

## 2019-12-15 NOTE — NUR
PATIENT RESTING WITH EYES CLOSED. RESPIRATIONS EASY AND UNLABORED. BED ALARM
ON AND CALL LIGHT WITHIN REACH.

## 2019-12-15 NOTE — NUR
Spoke with Dr. Casey regarding consult for lung nodules. No new orders.
Physician to follow up at bedside.

## 2019-12-15 NOTE — NUR
PATIENT AWAKE AND ALERT TO SELF. PLEASANTLY CONFUSED. PATIENTS ONLY COMPLAINT
IS BACK PAIN FROM LAYING IN THE BED. BED ALARM ON AND CALL LIGHT IN REACH.
WILL MONITOR.

## 2019-12-15 NOTE — NUR
PATIENT RESTING WITH EYES CLOSED. RESPIRATIONS EASY AND UNLABORED. PATIENT UP
ONCE TO RESTROOM. BED ALARM ON AND CALL LIGHT WITHIN REACH. WILL MONITOR.

## 2019-12-15 NOTE — NUR
PATIENT RESTING IN BED WATCHING TV. PATIENT STATES THAT HER BACK IS FEELING
BETTER. WILL MONITOR FOR EFFECTIVENESS.

## 2019-12-15 NOTE — NUR
PATIENT MEDICATED FOR C/O BACK PAIN FROM LAYING IN BED. NORCO ADMINISTERED AS
PRESCRIBED. WILL MONITOR FOR EFFECTIVENESS.

## 2019-12-16 VITALS — DIASTOLIC BLOOD PRESSURE: 70 MMHG

## 2019-12-16 VITALS — DIASTOLIC BLOOD PRESSURE: 53 MMHG

## 2019-12-16 VITALS — DIASTOLIC BLOOD PRESSURE: 75 MMHG | SYSTOLIC BLOOD PRESSURE: 151 MMHG

## 2019-12-16 VITALS — SYSTOLIC BLOOD PRESSURE: 146 MMHG | DIASTOLIC BLOOD PRESSURE: 70 MMHG

## 2019-12-16 VITALS — DIASTOLIC BLOOD PRESSURE: 62 MMHG

## 2019-12-16 NOTE — NUR
PATIENT AWAKE, ALERT, NO SIGNS OR SYMPTOMS OF DISTRESS AT THIS TIME. PLEASANT.
LUNGS DIMINISHED T/O, ROOM AIR. NO EDEMA. DENIES ANY PAIN. ABD SOFT &
NONTENDER. NO NEEDS VOICED AT THIS TIME, CALL LIGHT WITHIN REACH.

## 2019-12-16 NOTE — NUR
PATIENT RESTING WITH EYES CLOSED. RESPIRATIONS EASY AND UNLABORED. BED ALARM
ON AND CALL LIGHT IN REACH. WILL MONITOR.

## 2019-12-16 NOTE — NUR
OT NOTE
Pt was seen this A.M. 1:1 for 19 minute OT session. Upon arrival pt was
sitting upright in the recliner. Pt identified by name and  and had no
complaints at this time. Sit to stand completed from chair level with Cosme and
use of w/w for UE support. Functional mobility was then completed to the
bathroom with Cosme due to requiring assist for walker safety and walker
navigation. There she stood sink side while washing her hands with Cosme for
assist with correcting retrograde posture. Then challenged pt's static
standing tolerance needed for increased I in self care tasks and functional
transfers. Pt was able to tolerate aprox 4 minutes at a time before sitting
due to fatigue. Pt was left sitting upright in the recliner with call light in
hand, tray table in place, and body alarm activated for safety. Continue with
rec D/C plan to SNF.
 
MAGALIE Loredo/L

## 2019-12-16 NOTE — NUR
PATIENT REQUESTING PAIN MEDICATION FOR HER BACK TO HELP HER SLEEP. NORCO
ADMINISTERED AS PRESCRIBED. WILL MONITOR FOR EFFECTIVENESS.

## 2019-12-16 NOTE — NUR
PHYSICAL THERAPY
Patient was approached for afternoon therapy session and she declined saying
she was too tired from the therapy session this AM. Will check back with
patient tommorrow.
                                            JYOTSNA KNIGHT PTA

## 2019-12-16 NOTE — NUR
PATIENT SITTING IN BED WATCHING TV AT THIS TIME. PATIENT HAS NO COMPLAINTS.
SEE SHIFT ASSESSMENT. BED ALARM ON AND CALL LIGHT IN REACH. WILL MONITOR.

## 2019-12-16 NOTE — NUR
PHYSICAL THERAPY
Patient presented to therapy in sitting in bedside chair with chair alarm
attached. Patient has no complaints. Patient gives informed consent for
treatment. Patient was identified by name and  on wristband. Patient
scooted forwards to edge of chair herself. Patient performed sit to stand from
bedside chair with MIN A X 1. Patient required verbal cues for pushing off
armrests of chair with hands. Patient ambulated with Wh Walker and CGA X 1 for
60' X 2 with no LOB, but she did require manual correction of direction of Wh
Walker and verbal cues for wider SHALOM. Patient performed transfer back to
bedside chair with MIN A X 1 with verbal cues for putting hands back on
armrests of chair. Patient was left in bedside chair with chair alarm tested
and attached to patient, LEs elevated, and call light within reach. Patient's
tray table left in front of patient. Patient was 1:1 with this PTA for 17
minutes total. IN: 9:29 AM / OUT: 9:46 AM
                                      JYOTSNA KNIGHT PTA

## 2019-12-16 NOTE — NUR
PATIENT REQUESTING PAIN MEDICATION FOR BACK PAIN. NORCO ADMINISTERED AS
PRESCRIBED. WILL MONITOR FOR EFFECTIVENESS.

## 2019-12-17 VITALS — DIASTOLIC BLOOD PRESSURE: 79 MMHG

## 2019-12-17 VITALS — DIASTOLIC BLOOD PRESSURE: 65 MMHG

## 2019-12-17 VITALS — DIASTOLIC BLOOD PRESSURE: 61 MMHG

## 2019-12-17 VITALS — DIASTOLIC BLOOD PRESSURE: 81 MMHG

## 2019-12-17 NOTE — NUR
PER DR LOPEZ NOTE WILL HOLD DISCHARGE FOR TODAY.  CONTINUE IV ROCEPHIN.  LABS
IN AM.  WILL CONTINUE TO FOLLOW.

## 2019-12-17 NOTE — NUR
Hep Lock discontinued.
Site asymptomatic. Pressure applied. Sterile dressing applied.
JESSICA DOZIER

## 2019-12-17 NOTE — NUR
OT NOTE
Pt was seen this P.M. 1:1 for 15 minute OT session. Upon arrival pt was
sitting on the standard commode. Pt identified by name and  and had no
complaints at this time. Pt transferred sit to stand from commode level with
Cosme and use of w/w for UE support. Clothing management completed with modA
and toilet hygiene completed with Cosme while standing. She then stood sink
side while washing her hands with Cosme for assist with sequencing. Functional
mobility then completed around the room and back to the EOB with Cosme for
assist with walker navigation. Pt then transferred sit to supine with Cosme for
assist with BLE. There she was left with call light in hand, tray table in
place, and bed alarm activated for safety. Continue with rec D/C plan to SNF.
 
TIM Loredo

## 2019-12-17 NOTE — NUR
PHYSICAL THERAPY
 
Patient seen this pm 1:1 for therapy visit and was standing in her bathroom
with attendant following patient care upon therapist arrival. Patient
identified by name /  and ambulates with use of wh walker, MIN/CGA, 40'x 1,
demonstrating unsteady gait pattern with R side gait deviation. Patient
returned to supine in bed Mod A x 1 and remained with call light, tray table,
telephone and bed alarm for safety. Will continue per POC as tolerated, total
treatment time 16 minutes.  Richard Knox, PTA

## 2019-12-17 NOTE — NUR
PATIENT RESQUESTING PAIN MEDICATION FOR BACK PAIN. NORCO ADMINISTERED AS
PRESCRIBED. WILL MONITOR FOR EFFECTIVENESS. WILL MONITOR.

## 2019-12-17 NOTE — NUR
[place] [facility] here and patient placed on cart and packet given
to ambulance service and patient discharged back to [facility] with belongings.

## 2019-12-22 ENCOUNTER — HOSPITAL ENCOUNTER (EMERGENCY)
Dept: HOSPITAL 83 - ED | Age: 84
Discharge: OTHER INSTITUTION | End: 2019-12-22
Payer: MEDICARE

## 2019-12-22 VITALS — WEIGHT: 134 LBS | BODY MASS INDEX: 26.31 KG/M2 | HEIGHT: 60 IN

## 2019-12-22 VITALS — DIASTOLIC BLOOD PRESSURE: 60 MMHG

## 2019-12-22 DIAGNOSIS — Z86.73: ICD-10-CM
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
## 2019-12-22 DIAGNOSIS — Z79.899: ICD-10-CM

## 2019-12-22 DIAGNOSIS — E78.00: ICD-10-CM

## 2019-12-22 DIAGNOSIS — S42.031A: Primary | ICD-10-CM

## 2019-12-22 DIAGNOSIS — Z88.2: ICD-10-CM

## 2019-12-22 DIAGNOSIS — I48.91: ICD-10-CM
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
## 2019-12-22 DIAGNOSIS — I10: ICD-10-CM
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
